# Patient Record
Sex: FEMALE | Race: WHITE | NOT HISPANIC OR LATINO | Employment: FULL TIME | URBAN - METROPOLITAN AREA
[De-identification: names, ages, dates, MRNs, and addresses within clinical notes are randomized per-mention and may not be internally consistent; named-entity substitution may affect disease eponyms.]

---

## 2017-01-22 ENCOUNTER — HOSPITAL ENCOUNTER (EMERGENCY)
Facility: HOSPITAL | Age: 42
Discharge: HOME | End: 2017-01-22
Payer: COMMERCIAL

## 2017-01-22 VITALS
SYSTOLIC BLOOD PRESSURE: 139 MMHG | HEART RATE: 64 BPM | TEMPERATURE: 98 F | WEIGHT: 130 LBS | BODY MASS INDEX: 19.7 KG/M2 | RESPIRATION RATE: 20 BRPM | OXYGEN SATURATION: 99 % | HEIGHT: 68 IN | DIASTOLIC BLOOD PRESSURE: 76 MMHG

## 2017-01-22 DIAGNOSIS — S82.892A ANKLE FRACTURE, LEFT, CLOSED, INITIAL ENCOUNTER: ICD-10-CM

## 2017-01-22 DIAGNOSIS — M25.572 ACUTE LEFT ANKLE PAIN: ICD-10-CM

## 2017-01-22 DIAGNOSIS — S99.929A FOOT INJURY: ICD-10-CM

## 2017-01-22 DIAGNOSIS — S82.832A: Primary | ICD-10-CM

## 2017-01-22 PROCEDURE — 29515 APPLICATION SHORT LEG SPLINT: CPT

## 2017-01-22 PROCEDURE — 63600175 PHARM REV CODE 636 W HCPCS: Performed by: INTERNAL MEDICINE

## 2017-01-22 PROCEDURE — 99283 EMERGENCY DEPT VISIT LOW MDM: CPT | Mod: 25

## 2017-01-22 PROCEDURE — 63600175 PHARM REV CODE 636 W HCPCS: Performed by: PHYSICIAN ASSISTANT

## 2017-01-22 PROCEDURE — 96372 THER/PROPH/DIAG INJ SC/IM: CPT

## 2017-01-22 PROCEDURE — 25000003 PHARM REV CODE 250: Performed by: INTERNAL MEDICINE

## 2017-01-22 PROCEDURE — 25000003 PHARM REV CODE 250: Performed by: PHYSICIAN ASSISTANT

## 2017-01-22 RX ORDER — KETOROLAC TROMETHAMINE 30 MG/ML
60 INJECTION, SOLUTION INTRAMUSCULAR; INTRAVENOUS
Status: COMPLETED | OUTPATIENT
Start: 2017-01-22 | End: 2017-01-22

## 2017-01-22 RX ORDER — NAPROXEN 500 MG/1
500 TABLET ORAL 2 TIMES DAILY WITH MEALS
Qty: 12 TABLET | Refills: 0 | Status: SHIPPED | OUTPATIENT
Start: 2017-01-22 | End: 2017-09-01 | Stop reason: ALTCHOICE

## 2017-01-22 RX ORDER — HYDROMORPHONE HYDROCHLORIDE 2 MG/ML
1 INJECTION, SOLUTION INTRAMUSCULAR; INTRAVENOUS; SUBCUTANEOUS
Status: COMPLETED | OUTPATIENT
Start: 2017-01-22 | End: 2017-01-22

## 2017-01-22 RX ORDER — HYDROCODONE BITARTRATE AND ACETAMINOPHEN 10; 325 MG/1; MG/1
1 TABLET ORAL
Status: COMPLETED | OUTPATIENT
Start: 2017-01-22 | End: 2017-01-22

## 2017-01-22 RX ORDER — ONDANSETRON 4 MG/1
4 TABLET, FILM COATED ORAL EVERY 6 HOURS
Qty: 12 TABLET | Refills: 0 | Status: SHIPPED | OUTPATIENT
Start: 2017-01-22 | End: 2017-09-01

## 2017-01-22 RX ORDER — HYDROCODONE BITARTRATE AND ACETAMINOPHEN 7.5; 325 MG/1; MG/1
1 TABLET ORAL EVERY 6 HOURS PRN
Qty: 10 TABLET | Refills: 0 | Status: SHIPPED | OUTPATIENT
Start: 2017-01-22 | End: 2017-09-01 | Stop reason: ALTCHOICE

## 2017-01-22 RX ORDER — DIPHENHYDRAMINE HCL 50 MG
50 CAPSULE ORAL
Status: COMPLETED | OUTPATIENT
Start: 2017-01-22 | End: 2017-01-22

## 2017-01-22 RX ORDER — ONDANSETRON 4 MG/1
4 TABLET, ORALLY DISINTEGRATING ORAL ONCE
Qty: 1 TABLET | Refills: 0 | Status: SHIPPED | OUTPATIENT
Start: 2017-01-22 | End: 2017-01-22

## 2017-01-22 RX ORDER — ONDANSETRON 4 MG/1
4 TABLET, ORALLY DISINTEGRATING ORAL
Status: COMPLETED | OUTPATIENT
Start: 2017-01-22 | End: 2017-01-22

## 2017-01-22 RX ADMIN — HYDROCODONE BITARTRATE AND ACETAMINOPHEN 1 TABLET: 10; 325 TABLET ORAL at 04:01

## 2017-01-22 RX ADMIN — KETOROLAC TROMETHAMINE 60 MG: 30 INJECTION, SOLUTION INTRAMUSCULAR at 04:01

## 2017-01-22 RX ADMIN — DIPHENHYDRAMINE HYDROCHLORIDE 50 MG: 50 CAPSULE ORAL at 07:01

## 2017-01-22 RX ADMIN — ONDANSETRON 4 MG: 4 TABLET, ORALLY DISINTEGRATING ORAL at 04:01

## 2017-01-22 RX ADMIN — HYDROMORPHONE HYDROCHLORIDE 1 MG: 2 INJECTION, SOLUTION INTRAMUSCULAR; INTRAVENOUS; SUBCUTANEOUS at 06:01

## 2017-01-22 NOTE — DISCHARGE INSTRUCTIONS
Ankle Fracture, Distal Fibula  You have a fracture, or broken bone, of the end of the fibula bone. The fibula is one of two bones that support the ankle joint.    Home care  · You will be given a splint, cast, or special boot to prevent movement at the injury site. Do not put weight on a splint. It will break. Follow your healthcare providers advice about when to begin bearing weight on a cast or boot.  · Keep your leg elevated when sitting or lying down. When sleeping, place a pillow under the injured leg. When sitting, support the injured leg so it is level with your waist. This is very important during the first 48 hours.  · Keep the cast or splint completely dry at all times. When bathing, protect the cast or splint with 2 large plastic bags. Place 1 bag outside of the other. Tape each bag with duct tape at the top end. Water can still leak in even when the foot is covered. So it's best to keep the cast, splint, or boot away from water. If a fiberglass cast or splint gets wet, dry it with a hair dryer on a cool setting.  · Place an ice pack over the injured area for no more than 15 to 20 minutes. Do this every 3 to 6 hours for the first 24 to 48 hours. Continue this 3 to 4 times a day as needed. To make an ice pack, put ice cubes in a plastic bag that seals at the top. Wrap the bag in a clean, thin towel or cloth. Never put ice or an ice pack directly on the skin. The ice pack can be put right on the cast or splint. As the ice melts, be careful that the cast or splint doesnt get wet.  · You may use over-the-counter pain medicine to control pain, unless another pain medicine was prescribed. If you have chronic liver or kidney disease or ever had a stomach ulcer or GI bleeding, talk with your provider before using these medicines.  Follow-up care  Follow up with your healthcare provider in 1 week, or as advised. This is to be sure the bone is healing properly. If you were given a splint, it may be changed to a  cast after the swelling goes down.  If X-rays were taken, you will be told of any new findings that may affect your care.  When to seek medical advice  Call your healthcare provider right away if any of these occur:  · The plaster cast or splint becomes wet or soft  · The fiberglass cast or splint stays wet for more than 24 hours  · There is increased tightness or pain under the cast or splint  · Your toes become swollen, cold, blue, numb, or tingly  · The cast becomes loose  · The cast has a bad smell  · Sore areas develop under the cast  · The cast develops cracks or breaks   © 2550-3952 [employer]. [address] [place] [address], [place] [address]. All rights reserved. This information is not intended as a substitute for professional medical care. Always follow your healthcare professional's instructions.

## 2017-01-22 NOTE — ED AVS SNAPSHOT
[facility]  [address] [facility] [address]               Laisha Rico   2017  3:11 PM   ED    Description:  Female : 1975   Department:  [facility]            Your Care was Coordinated By:     Provider Role From To    Corine Castro PA-C Physician Assistant 17 [phone] --      Reason for Visit     Foot Pain           Diagnoses this Visit        Comments    Nondisplaced fracture of distal end of left fibula, initial encounter    -  Primary     Foot injury         Acute left ankle pain         Ankle fracture, left, closed, initial encounter           ED Disposition     None           To Do List           Follow-up Information     Follow up with [facility] In 3 days.    Specialty:  Orthopedics    Contact information:    [address] [facility] [address]  [facility] [address] [phone]    Additional information:    (off [email or web address]) 2nd floor       These Medications        Disp Refills Start End    naproxen (NAPROSYN) 500 MG tablet 12 tablet 0 2017     Take 1 tablet (500 mg total) by mouth 2 (two) times daily with meals. - Oral    Pharmacy: [facility] [address] [place] [address] [address] AT [facility] Ph #: [phone]       hydrocodone-acetaminophen 7.5-325mg (NORCO) 7.5-325 mg per tablet 10 tablet 0 2017     Take 1 tablet by mouth every 6 (six) hours as needed for Pain. - Oral    Pharmacy: [facility] [address] [place] [address] [address] AT [facility] Ph #: [phone]         [facility]     [facility] -  Assistance  Registered nurses in the [facility] provide clinical advisement, health education, appointment booking, and other advisory services.  Call for this free service at [phone].             Medications           Message regarding Medications     Verify the  changes and/or additions to your medication regime listed below are the same as discussed with your clinician today.  If any of these changes or additions are incorrect, please notify your healthcare provider.        START taking these NEW medications        Refills    naproxen (NAPROSYN) 500 MG tablet 0    Sig: Take 1 tablet (500 mg total) by mouth 2 (two) times daily with meals.    Class: Print    Route: Oral    hydrocodone-acetaminophen 7.5-325mg (NORCO) 7.5-325 mg per tablet 0    Sig: Take 1 tablet by mouth every 6 (six) hours as needed for Pain.    Class: Print    Route: Oral      These medications were administered today        Dose Freq    hydrocodone-acetaminophen 10-325mg per tablet 1 tablet 1 tablet ED 1 Time    Sig: Take 1 tablet by mouth ED 1 Time.    Class: Normal    Route: Oral    Cosign for Ordering: Required by Catia Sheth MD    ondansetron disintegrating tablet 4 mg 4 mg ED 1 Time    Sig: Take 1 tablet (4 mg total) by mouth ED 1 Time.    Class: Normal    Route: Oral    Cosign for Ordering: Required by Catia Sheth MD    ketorolac injection 60 mg 60 mg ED 1 Time    Sig: Inject 2 mLs (60 mg total) into the muscle ED 1 Time.    Class: Normal    Route: Intramuscular    Non-formulary Exception Code: Treatment failure with formulary alternative    Cosign for Ordering: Required by Brigid Lanier MD           Verify that the below list of medications is an accurate representation of the medications you are currently taking.  If none reported, the list may be blank. If incorrect, please contact your healthcare provider. Carry this list with you in case of emergency.           Current Medications     hydrocodone-acetaminophen 7.5-325mg (NORCO) 7.5-325 mg per tablet Take 1 tablet by mouth every 6 (six) hours as needed for Pain.    ketorolac injection 60 mg Inject 2 mLs (60 mg total) into the muscle ED 1 Time.    naproxen (NAPROSYN) 500 MG tablet Take 1 tablet (500 mg total) by mouth 2 (two) times  "daily with meals.           Clinical Reference Information           Your Vitals Were     BP Pulse Temp Resp Height Weight    136/75 (BP Location: Right arm, Patient Position: Sitting) 89 98.2 °F (36.8 °C) (Oral) 18 5' 8" (1.727 m) 59 kg (130 lb)    SpO2 BMI             98% 19.77 kg/m2         Allergies as of 1/22/2017        Reactions    Iodine And Iodide Containing Products Itching, Swelling, Rash      Immunizations Administered on Date of Encounter - 1/22/2017     None      ED Micro, Lab, POCT     None      ED Imaging Orders     Start Ordered       Status Ordering Provider    01/22/17 1513 01/22/17 1513  X-Ray Ankle Complete Left  1 time imaging      Final result     01/22/17 1509 01/22/17 1508  X-Ray Foot Complete Left  1 time imaging      Final result         Discharge Instructions         Ankle Fracture, Distal Fibula  You have a fracture, or broken bone, of the end of the fibula bone. The fibula is one of two bones that support the ankle joint.    Home care  · You will be given a splint, cast, or special boot to prevent movement at the injury site. Do not put weight on a splint. It will break. Follow your healthcare providers advice about when to begin bearing weight on a cast or boot.  · Keep your leg elevated when sitting or lying down. When sleeping, place a pillow under the injured leg. When sitting, support the injured leg so it is level with your waist. This is very important during the first 48 hours.  · Keep the cast or splint completely dry at all times. When bathing, protect the cast or splint with 2 large plastic bags. Place 1 bag outside of the other. Tape each bag with duct tape at the top end. Water can still leak in even when the foot is covered. So it's best to keep the cast, splint, or boot away from water. If a fiberglass cast or splint gets wet, dry it with a hair dryer on a cool setting.  · Place an ice pack over the injured area for no more than 15 to 20 minutes. Do this every 3 to " 6 hours for the first 24 to 48 hours. Continue this 3 to 4 times a day as needed. To make an ice pack, put ice cubes in a plastic bag that seals at the top. Wrap the bag in a clean, thin towel or cloth. Never put ice or an ice pack directly on the skin. The ice pack can be put right on the cast or splint. As the ice melts, be careful that the cast or splint doesnt get wet.  · You may use over-the-counter pain medicine to control pain, unless another pain medicine was prescribed. If you have chronic liver or kidney disease or ever had a stomach ulcer or GI bleeding, talk with your provider before using these medicines.  Follow-up care  Follow up with your healthcare provider in 1 week, or as advised. This is to be sure the bone is healing properly. If you were given a splint, it may be changed to a cast after the swelling goes down.  If X-rays were taken, you will be told of any new findings that may affect your care.  When to seek medical advice  Call your healthcare provider right away if any of these occur:  · The plaster cast or splint becomes wet or soft  · The fiberglass cast or splint stays wet for more than 24 hours  · There is increased tightness or pain under the cast or splint  · Your toes become swollen, cold, blue, numb, or tingly  · The cast becomes loose  · The cast has a bad smell  · Sore areas develop under the cast  · The cast develops cracks or breaks   © 2214-4198 [employer]. [address] [place] [address]. All rights reserved. This information is not intended as a substitute for professional medical care. Always follow your healthcare professional's instructions.          [facility] Sign-Up     Activating your [facility] account is as easy as 1-2-3!     1) Visit [email or web address], select Sign Up Now, enter this activation code and your date of birth, then select Next.  [email or web address]  Expires: 3/8/2017  4:44 PM      2) Create a username and password to use when you visit  [facility] in the future and select a security question in case you lose your password and select Next.    3) Enter your e-mail address and click Sign Up!    Additional Information  If you have questions, please e-mail [email or web address] or call [phone] to talk to our [facility] staff. Remember, [facility] is NOT to be used for urgent needs. For medical emergencies, dial 911.          [facility] complies with applicable Federal civil rights laws and does not discriminate on the basis of race, color, national origin, age, disability, or sex.        Language Assistance Services     ATTENTION: Language assistance services are available, free of charge. Please call [phone].      ATENCIÓN: Si robyla jacqueline, tiene a bean disposición servicios gratuitos de asistencia lingüística. Llame al [phone].     CHÚ Ý: N?u b?n nói Ti?ng Vi?t, có các d?ch v? h? tr? ngôn ng? mi?n phí dành cho b?n. G?i s? [phone].

## 2017-01-22 NOTE — ED PROVIDER NOTES
"SCRIBE #1 NOTE: I, Sam Jaramillo, am scribing for, and in the presence of, RADHA Washington. I have scribed the entire note.      History      Chief Complaint   Patient presents with    Foot Pain     pt. reports left foot pain and swelling        Review of patient's allergies indicates:   Allergen Reactions    Iodine and iodide containing products Itching, Swelling and Rash        HPI   HPI    1/22/2017, 3:12 PM   History obtained from the patient      History of Present Illness: Laisha Rico is a 41 y.o. female patient who presents to the Emergency Department for left foot/ankle pain and swelling which onset suddenly 30-45 minutes ago while pt was jumping on trampoline at [email or web address]. Pt states she was jumping, landed on the mat improperly on left foot hearing a "popping sound". Pt points to base of 5th metatarsal and lateral malleolus to identify location of pain. Sx are constant and moderate in severity. Pt states she cannot bear weight on that foot. Pain worse with movement. There are no other mitigating or exacerbating factors noted.  Pt denies any weakness or numbness, N/V, CP, knee pain SOB, and all other sx at this time. No further complaints or concerns at this time.       Arrival mode: Personal vehicle      PCP: Primary Doctor No       Past Medical History:  Past Medical History   Diagnosis Date    Migraines        Past Surgical History:  Past Surgical History   Procedure Laterality Date    Removed chicken bone from throat           Family History:  No family history on file.    Social History:  Social History     Social History Main Topics    Smoking status: Never Smoker    Smokeless tobacco: Not on file    Alcohol use No    Drug use: No    Sexual activity: Yes       ROS   Review of Systems   Constitutional: Negative for chills and fever.   HENT: Negative for sore throat and trouble swallowing.    Respiratory: Negative for cough and shortness of breath.    Cardiovascular: Negative for chest pain. "   Gastrointestinal: Negative for abdominal pain, diarrhea, nausea and vomiting.   Genitourinary: Negative for dysuria.   Musculoskeletal: Positive for gait problem. Negative for back pain.        + left foot pain and swelling  - knee pain   Skin: Negative for rash and wound.   Neurological: Negative for weakness and numbness.   Hematological: Does not bruise/bleed easily.   All other systems reviewed and are negative.      Physical Exam    Initial Vitals   BP Pulse Resp Temp SpO2   01/22/17 1505 01/22/17 1505 01/22/17 1505 01/22/17 1505 01/22/17 1505   136/75 89 18 98.2 °F (36.8 °C) 98 %      Physical Exam  Nursing Notes and Vital Signs Reviewed.  Constitutional: Patient is in no acute distress. Awake and alert. Well-developed and well-nourished.  Head: Atraumatic. Normocephalic.  Eyes: PERRL. EOM intact. Conjunctivae are not pale. No scleral icterus.  ENT: Mucous membranes are moist.    Neck: Supple. Full ROM.    Cardiovascular: Regular rate. Well perfused.  Pulmonary/Chest: No respiratory distress.    Abdominal: Soft and non-distended.   Musculoskeletal: Moves all extremities. No obvious deformities. No edema.   Left Knee:  No obvious deformity. There is no swelling or tenderness.  No increased warmth, erythema, induration or fluctuance.  No ligament laxity.  DP and PT pulses are 2+.  Normal capillary refill.  Distal sensation is intact.  Left Ankle:  No obvious deformity. There is swelling and tenderness to palpation of 5th metatarsal and lateral malleolus.  No bony tenderness over navicular.  No proximal fibular tenderness. She is not able to bear weight.   Ankle ROM limited due to pain.  Intact sensation to light touch. Distal capillary refill takes less than 2 seconds.  PT and DP pulses are 2+ bilaterally.  Skin: Warm and dry.  Neurological:  Alert, awake, and appropriate.  Normal speech.  No acute focal neurological deficits are appreciated.  Psychiatric: Normal affect. Good eye contact. Appropriate in  "content.    ED Course    Orthopedic Injury  Date/Time: 1/22/2017 4:35 PM  Authorized by: PHILLIP NEIL   Performed by: CHELSEA JOHNSON  Injury location: ankle  Location details: left ankle  Injury type: fracture (distal fibula)  Pre-procedure distal perfusion: normal  Pre-procedure neurological function: normal  Pre-procedure neurovascular assessment: neurovascularly intact  Pre-procedure range of motion: reduced  Immobilization: splint  Splint type: short leg  Supplies used: aluminum splint  Complications: No  Post-procedure neurovascular assessment: post-procedure neurovascularly intact  Post-procedure distal perfusion: normal  Post-procedure neurological function: normal  Post-procedure range of motion: unchanged  Patient tolerance: Patient tolerated the procedure well with no immediate complications        ED Vital Signs:  Vitals:    01/22/17 1505   BP: 136/75   Pulse: 89   Resp: 18   Temp: 98.2 °F (36.8 °C)   TempSrc: Oral   SpO2: 98%   Weight: 59 kg (130 lb)   Height: 5' 8" (1.727 m)      Imaging Results:  Imaging Results         X-Ray Ankle Complete Left (Final result) Result time:  01/22/17 16:31:43    Final result by Montse Moran MD (01/22/17 16:31:43)    Impression:     There is transverse nondisplaced fracture through the distal head of the fibula.      Electronically signed by: MONTSE MORAN MD  Date:     01/22/17  Time:    16:31     Narrative:    Procedure: XR ANKLE COMPLETE 3 VIEW LEFT    History:  Pain in left ankle.    Findings: There is transverse fracture through the distal fibula at the level of the lateral malleolus.  This appears nondisplaced with associated soft tissue swelling.  The tibia appears intact.  The mortise joint of the ankle appears intact.            X-Ray Foot Complete Left (Final result) Result time:  01/22/17 16:29:33    Final result by Montse Moran MD (01/22/17 16:29:33)    Impression:     Negative study      Electronically signed by: MONTSE MORAN MD  Date: "     01/22/17  Time:    16:29     Narrative:    Complete three view left foot x-ray.     History: Unspecified injury of left foot.  Initial encounter.    Bone density and architecture are normal. No acute findings.                 The Emergency Provider reviewed the vital signs and test results, which are outlined above.    ED Discussion     4:45 PM: Reassessed pt at this time. Awake and alert. Pt states her pain has improved at this time. Discussed with pt all pertinent ED information and results. Discussed pt dx and plan of tx. Gave pt all f/u and return to the ED instructions. All questions and concerns were addressed at this time. Pt expresses understanding of information and instructions, and is comfortable with plan to discharge. Pt is stable for discharge.    I discussed with patient and/or family/caretaker that evaluation in the ED does not suggest any emergent or life threatening medical conditions requiring immediate intervention beyond what was provided in the ED, and I believe patient is safe for discharge.  Regardless, an unremarkable evaluation in the ED does not preclude the development or presence of a serious of life threatening condition. As such, patient was instructed to return immediately for any worsening or change in current symptoms.      ED Medication(s):  Medications   hydrocodone-acetaminophen 10-325mg per tablet 1 tablet (1 tablet Oral Given 1/22/17 1612)   ondansetron disintegrating tablet 4 mg (4 mg Oral Given 1/22/17 1613)   ketorolac injection 60 mg (60 mg Intramuscular Given 1/22/17 1647)       New Prescriptions    HYDROCODONE-ACETAMINOPHEN 7.5-325MG (NORCO) 7.5-325 MG PER TABLET    Take 1 tablet by mouth every 6 (six) hours as needed for Pain.    NAPROXEN (NAPROSYN) 500 MG TABLET    Take 1 tablet (500 mg total) by mouth 2 (two) times daily with meals.       Follow-up Information     Follow up with [facility] In 3 days.    Specialty:  Orthopedics    Contact information:     [address]  [facility] [address]  [phone]    Additional information:    (off [facility]) 2nd floor             Medical Decision Making    Medical Decision Making:   Clinical Tests:   Radiological Study: Ordered and Reviewed           Scribe Attestation:   Scribe #1: I performed the above scribed service and the documentation accurately describes the services I performed. I attest to the accuracy of the note.    APC:   APC Statement for Scribe #1: I, RADHA Washington, personally performed the services described in this documentation, as scribed by Sam Jaramillo in my presence, and it is both accurate and complete.          Clinical Impression       ICD-10-CM ICD-9-CM   1. Nondisplaced fracture of distal end of left fibula, initial encounter S82.832A 824.8   2. Foot injury S99.929A 959.7   3. Acute left ankle pain M25.572 719.47   4. Ankle fracture, left, closed, initial encounter S82.892A 824.8       Disposition:   Disposition: Discharged  Condition: Stable         Corine Castro PA-C  01/22/17 1657

## 2017-02-21 ENCOUNTER — OFFICE VISIT (OUTPATIENT)
Dept: INTERNAL MEDICINE | Facility: CLINIC | Age: 42
End: 2017-02-21
Payer: COMMERCIAL

## 2017-02-21 VITALS
TEMPERATURE: 98 F | SYSTOLIC BLOOD PRESSURE: 118 MMHG | HEIGHT: 68 IN | DIASTOLIC BLOOD PRESSURE: 78 MMHG | WEIGHT: 133.81 LBS | BODY MASS INDEX: 20.28 KG/M2

## 2017-02-21 DIAGNOSIS — J06.9 VIRAL URI: Primary | ICD-10-CM

## 2017-02-21 PROCEDURE — 99999 PR PBB SHADOW E&M-EST. PATIENT-LVL III: CPT | Mod: PBBFAC,,, | Performed by: NURSE PRACTITIONER

## 2017-02-21 PROCEDURE — 99213 OFFICE O/P EST LOW 20 MIN: CPT | Mod: 25,S$GLB,, | Performed by: NURSE PRACTITIONER

## 2017-02-21 PROCEDURE — 96372 THER/PROPH/DIAG INJ SC/IM: CPT | Mod: S$GLB,,, | Performed by: NURSE PRACTITIONER

## 2017-02-21 RX ORDER — METHYLPREDNISOLONE ACETATE 80 MG/ML
80 INJECTION, SUSPENSION INTRA-ARTICULAR; INTRALESIONAL; INTRAMUSCULAR; SOFT TISSUE
Status: COMPLETED | OUTPATIENT
Start: 2017-02-21 | End: 2017-02-21

## 2017-02-21 RX ADMIN — METHYLPREDNISOLONE ACETATE 80 MG: 80 INJECTION, SUSPENSION INTRA-ARTICULAR; INTRALESIONAL; INTRAMUSCULAR; SOFT TISSUE at 03:02

## 2017-02-21 NOTE — PROGRESS NOTES
"Subjective:      Patient ID: Laisha Rico is a 41 y.o. female.    Chief Complaint: Sore Throat and Fever    HPI:  Patient states for the last 3 days she has had nasal congestion, mild sore throat, headache.   She is not coughing or wheezing.  She is feeling some sinus pressure. Has had temp of 100.7 once at home.  She denies coughing, sob, or wheezing.  Denies ill contacts.  She is going to fly for work in a few day, ears feel full    Past Medical History   Diagnosis Date    Migraines        Past Surgical History   Procedure Laterality Date    Removed chicken bone from throat         No results found for: WBC, HGB, HCT, PLT, CHOL, TRIG, HDL, LDLDIRECT, ALT, AST, NA, K, CL, CREATININE, BUN, CO2, TSH, PSA, INR, GLUF, HGBA1C, MICROALBUR    Visit Vitals    /78 (BP Location: Left arm, Patient Position: Sitting, BP Method: Manual)    Temp 98.2 °F (36.8 °C) (Tympanic)    Ht 5' 7.5" (1.715 m)    Wt 60.7 kg (133 lb 13.1 oz)    LMP 02/12/2017 (Exact Date)    BMI 20.65 kg/m2         Review of Systems   Constitutional: Negative for appetite change, fatigue and unexpected weight change.   HENT: Positive for congestion and sore throat. Negative for ear pain, postnasal drip, rhinorrhea, sinus pressure, sneezing, tinnitus, trouble swallowing and voice change.    Eyes: Negative for pain, discharge, redness, itching and visual disturbance.   Respiratory: Negative for cough, chest tightness, shortness of breath and wheezing.    Cardiovascular: Negative for chest pain, palpitations and leg swelling.   Gastrointestinal: Negative for abdominal distention, abdominal pain, blood in stool, constipation, diarrhea, nausea and vomiting.        No reflux.   Genitourinary: Negative for difficulty urinating, dyspareunia, flank pain, menstrual problem and pelvic pain.   Musculoskeletal: Negative for arthralgias, back pain, myalgias and neck stiffness.   Skin: Negative for color change and rash.   Neurological: Negative for " dizziness and headaches.   Psychiatric/Behavioral: Negative for confusion and sleep disturbance. The patient is not nervous/anxious.       Objective:     Physical Exam   Constitutional: She is oriented to person, place, and time. She appears well-developed and well-nourished. No distress.   HENT:   Head: Normocephalic and atraumatic.   Mouth/Throat: Oropharynx is clear and moist. No oropharyngeal exudate.   Bilateral TM bulging slightly, no redness or drainage  No sinus ttp   Neck: Neck supple.   Cardiovascular: Normal rate, regular rhythm and normal heart sounds.  Exam reveals no gallop and no friction rub.    No murmur heard.  Pulmonary/Chest: Effort normal and breath sounds normal. No respiratory distress. She has no wheezes. She has no rales.   Musculoskeletal: She exhibits no edema or tenderness.   Lymphadenopathy:     She has no cervical adenopathy.   Neurological: She is alert and oriented to person, place, and time. No cranial nerve deficit.   Skin: Skin is warm and dry. She is not diaphoretic.   Psychiatric: She has a normal mood and affect. Her behavior is normal.   Nursing note and vitals reviewed.    Assessment:      1. Viral URI      Plan:   Viral URI    Other orders  -     methylPREDNISolone acetate injection 80 mg; Inject 1 mL (80 mg total) into the muscle one time.    Force fluids, rest.  flonase nasal spray daily    Current Outpatient Prescriptions:     hydrocodone-acetaminophen 7.5-325mg (NORCO) 7.5-325 mg per tablet, Take 1 tablet by mouth every 6 (six) hours as needed for Pain., Disp: 10 tablet, Rfl: 0    naproxen (NAPROSYN) 500 MG tablet, Take 1 tablet (500 mg total) by mouth 2 (two) times daily with meals., Disp: 12 tablet, Rfl: 0    ondansetron (ZOFRAN) 4 MG tablet, Take 1 tablet (4 mg total) by mouth every 6 (six) hours., Disp: 12 tablet, Rfl: 0  No current facility-administered medications for this visit.

## 2017-02-21 NOTE — MR AVS SNAPSHOT
[facility]  [address] [place] [id number]  Phone: [phone]                  Laisha Rico   2017 3:00 PM   Office Visit    Description:  Female : 1975   Provider:  Osmani Blanco NP   Department:  [facility]           Reason for Visit     Sore Throat     Fever                To Do List           Goals (5 Years of Data)     None      [facility]     [facility] On Call Nurse [facility] Line -  Assistance  Registered nurses in the [facility] On Call Center provide clinical advisement, health education, appointment booking, and other advisory services.  Call for this free service at [phone].             Medications           Message regarding Medications     Verify the changes and/or additions to your medication regime listed below are the same as discussed with your clinician today.  If any of these changes or additions are incorrect, please notify your healthcare provider.        These medications were administered today        Dose Freq    methylPREDNISolone acetate injection 80 mg 80 mg Clinic/HOD 1 time    Sig: Inject 1 mL (80 mg total) into the muscle one time.    Class: Normal    Route: Intramuscular           Verify that the below list of medications is an accurate representation of the medications you are currently taking.  If none reported, the list may be blank. If incorrect, please contact your healthcare provider. Carry this list with you in case of emergency.           Current Medications     hydrocodone-acetaminophen 7.5-325mg (NORCO) 7.5-325 mg per tablet Take 1 tablet by mouth every 6 (six) hours as needed for Pain.    naproxen (NAPROSYN) 500 MG tablet Take 1 tablet (500 mg total) by mouth 2 (two) times daily with meals.    ondansetron (ZOFRAN) 4 MG tablet Take 1 tablet (4 mg total) by mouth every 6 (six) hours.           Clinical Reference Information           Your Vitals Were     BP Temp Height    118/78 (BP Location: Left  "arm, Patient Position: Sitting, BP Method: Manual) 98.2 °F (36.8 °C) (Tympanic) 5' 7.5" (1.715 m)    Weight Last Period BMI    60.7 kg (133 lb 13.1 oz) 02/12/2017 (Exact Date) 20.65 kg/m2      Blood Pressure          Most Recent Value    BP  118/78      Allergies as of 2/21/2017     Iodine And Iodide Containing Products      Immunizations Administered on Date of Encounter - 2/21/2017     None      [facility] Sign-Up     Activating your [facility] account is as easy as 1-2-3!     1) Visit [email or web address], select Sign Up Now, enter this activation code and your date of birth, then select Next.  [email or web address]  Expires: 3/8/2017  4:44 PM      2) Create a username and password to use when you visit [facility] in the future and select a security question in case you lose your password and select Next.    3) Enter your e-mail address and click Sign Up!    Additional Information  If you have questions, please e-mail [email or web address] or call [phone] to talk to our [facility] staff. Remember, [facility] is NOT to be used for urgent needs. For medical emergencies, dial 911.         Instructions    Vanesa JONES and Isaura Wilhelm can help with the ear        Language Assistance Services     ATTENTION: Language assistance services are available, free of charge. Please call [phone].      ATENCIÓN: Si habla español, tiene a bean disposición servicios gratuitos de asistencia lingüística. Llame al [phone].     CHÚ Ý: N?u b?n nói Ti?ng Vi?t, có các d?ch v? h? tr? ngôn ng? mi?n phí dành cho b?n. G?i s? [phone].         [facility] complies with applicable Federal civil rights laws and does not discriminate on the basis of race, color, national origin, age, disability, or sex.        "

## 2017-02-23 ENCOUNTER — TELEPHONE (OUTPATIENT)
Dept: INTERNAL MEDICINE | Facility: CLINIC | Age: 42
End: 2017-02-23

## 2017-02-23 RX ORDER — AMOXICILLIN 875 MG/1
875 TABLET, FILM COATED ORAL 2 TIMES DAILY
Qty: 14 TABLET | Refills: 0 | Status: SHIPPED | OUTPATIENT
Start: 2017-02-23 | End: 2017-03-02

## 2017-02-23 NOTE — TELEPHONE ENCOUNTER
----- Message from Bautista Oliva sent at 2/23/2017  8:27 AM CST -----  Contact: Pt   Pt states she still has a fever and now has a cough and is requesting a Rx./ She can be reached at [phone]    [facility] [id number] [place] [address] [address] [facility]  [address]  [facility] [address]  Phone: [phone] Fax: [phone]

## 2017-02-23 NOTE — TELEPHONE ENCOUNTER
"S/w pt. Stated, " I actually have a cough now and I still have fever at 100.2. When I blow and cough, it is brownish green. I think that this is bacterial. Can I get an antibiotic now? I am going out of town this weekend. ". MATTHEW 02/21/17. Please advise. /TGD  "

## 2017-09-01 ENCOUNTER — OFFICE VISIT (OUTPATIENT)
Dept: INTERNAL MEDICINE | Facility: CLINIC | Age: 42
End: 2017-09-01
Payer: COMMERCIAL

## 2017-09-01 VITALS
RESPIRATION RATE: 18 BRPM | HEART RATE: 96 BPM | OXYGEN SATURATION: 99 % | DIASTOLIC BLOOD PRESSURE: 69 MMHG | BODY MASS INDEX: 19.78 KG/M2 | TEMPERATURE: 98 F | SYSTOLIC BLOOD PRESSURE: 120 MMHG | WEIGHT: 130.5 LBS | HEIGHT: 68 IN

## 2017-09-01 DIAGNOSIS — R50.9 FEVER, UNSPECIFIED FEVER CAUSE: Primary | ICD-10-CM

## 2017-09-01 DIAGNOSIS — J32.9 SINUSITIS, UNSPECIFIED CHRONICITY, UNSPECIFIED LOCATION: ICD-10-CM

## 2017-09-01 LAB
CTP QC/QA: YES
FLUAV AG NPH QL: NEGATIVE
FLUBV AG NPH QL: NEGATIVE

## 2017-09-01 PROCEDURE — 99999 PR PBB SHADOW E&M-EST. PATIENT-LVL III: CPT | Mod: PBBFAC,,, | Performed by: FAMILY MEDICINE

## 2017-09-01 PROCEDURE — 99214 OFFICE O/P EST MOD 30 MIN: CPT | Mod: 25,S$GLB,, | Performed by: FAMILY MEDICINE

## 2017-09-01 PROCEDURE — 96372 THER/PROPH/DIAG INJ SC/IM: CPT | Mod: S$GLB,,, | Performed by: FAMILY MEDICINE

## 2017-09-01 PROCEDURE — 3008F BODY MASS INDEX DOCD: CPT | Mod: S$GLB,,, | Performed by: FAMILY MEDICINE

## 2017-09-01 PROCEDURE — 87804 INFLUENZA ASSAY W/OPTIC: CPT | Mod: QW,S$GLB,, | Performed by: FAMILY MEDICINE

## 2017-09-01 RX ORDER — AZITHROMYCIN 250 MG/1
250 TABLET, FILM COATED ORAL DAILY
Qty: 6 TABLET | Refills: 0 | Status: SHIPPED | OUTPATIENT
Start: 2017-09-01 | End: 2017-09-27 | Stop reason: ALTCHOICE

## 2017-09-01 RX ORDER — METHYLPREDNISOLONE ACETATE 80 MG/ML
80 INJECTION, SUSPENSION INTRA-ARTICULAR; INTRALESIONAL; INTRAMUSCULAR; SOFT TISSUE
Status: COMPLETED | OUTPATIENT
Start: 2017-09-01 | End: 2017-09-01

## 2017-09-01 RX ADMIN — METHYLPREDNISOLONE ACETATE 80 MG: 80 INJECTION, SUSPENSION INTRA-ARTICULAR; INTRALESIONAL; INTRAMUSCULAR; SOFT TISSUE at 12:09

## 2017-09-01 NOTE — PROGRESS NOTES
Depo Medrol 80 mg given IM left ventrogluteal , pt tolerated well, she left the clinic in stable condition

## 2017-09-01 NOTE — PROGRESS NOTES
Subjective:       Patient ID: Laisha Rico is a 42 y.o. female.    Chief Complaint: Cough (cough, sinus congestion and fever 101.5 for 2 days )      Patient reports fever, coughing, sinus pain and body aches. Reports 2 of her children with similar symptoms. Tmax 101.5 yesterday evening. Reports she has frequent problems with her sinuses.      Cough   This is a new problem. The current episode started in the past 7 days. The problem has been unchanged. The problem occurs every few hours. The cough is non-productive. Associated symptoms include chills, myalgias, nasal congestion, postnasal drip and rhinorrhea. Pertinent negatives include no chest pain, fever or sore throat. Nothing aggravates the symptoms. She has tried nothing for the symptoms. There is no history of asthma or COPD.     Review of Systems   Constitutional: Positive for appetite change, chills and fatigue. Negative for fever.   HENT: Positive for postnasal drip, rhinorrhea, sinus pain and sinus pressure. Negative for congestion and sore throat.    Eyes: Negative for visual disturbance.   Respiratory: Positive for cough. Negative for chest tightness.    Cardiovascular: Negative for chest pain.   Gastrointestinal: Negative for abdominal pain.   Endocrine: Negative for polyuria.   Musculoskeletal: Positive for myalgias. Negative for gait problem.   Skin: Negative for color change.   Allergic/Immunologic: Negative for immunocompromised state.   Neurological: Negative for dizziness.   Hematological: Does not bruise/bleed easily.   Psychiatric/Behavioral: Negative for decreased concentration.     Past Medical History:   Diagnosis Date    Migraines      Past Surgical History:   Procedure Laterality Date    removed chicken bone from throat       History reviewed. No pertinent family history.  Social History     Social History    Marital status:      Spouse name: N/A    Number of children: 5    Years of education: N/A     Occupational History  "   [employer]     Social History Main Topics    Smoking status: Never Smoker    Smokeless tobacco: Never Used    Alcohol use No    Drug use: No    Sexual activity: Yes     Other Topics Concern    Not on file     Social History Narrative    No narrative on file     Review of patient's allergies indicates:   Allergen Reactions    Iodine and iodide containing products Itching, Swelling and Rash       Objective:       /69 (BP Location: Left arm, Patient Position: Sitting, BP Method: Large (Automatic))   Pulse 96   Temp 97.6 °F (36.4 °C) (Tympanic)   Resp 18   Ht 5' 7.5" (1.715 m)   Wt 59.2 kg (130 lb 8.2 oz)   LMP 08/16/2017   SpO2 99%   BMI 20.14 kg/m²   Physical Exam   Constitutional: She is oriented to person, place, and time. She appears well-developed and well-nourished. No distress.   HENT:   Head: Normocephalic.   Right Ear: Tympanic membrane, external ear and ear canal normal.   Left Ear: Tympanic membrane, external ear and ear canal normal.   Nose: Mucosal edema present. Right sinus exhibits maxillary sinus tenderness and frontal sinus tenderness. Left sinus exhibits maxillary sinus tenderness and frontal sinus tenderness.   Mouth/Throat: Uvula is midline, oropharynx is clear and moist and mucous membranes are normal.   Eyes: EOM are normal. Pupils are equal, round, and reactive to light.   Neck: No thyromegaly present.   Cardiovascular: Normal rate, regular rhythm, normal heart sounds and intact distal pulses.    Pulmonary/Chest: Effort normal and breath sounds normal. No respiratory distress.   Abdominal: Soft. She exhibits no distension. There is no tenderness.   Musculoskeletal: Normal range of motion. She exhibits no edema.   Lymphadenopathy:     She has no cervical adenopathy.   Neurological: She is alert and oriented to person, place, and time.   Skin: Skin is warm and dry. Capillary refill takes less than 2 seconds. She is not diaphoretic.   Psychiatric: She has a " normal mood and affect. Her behavior is normal.   Nursing note and vitals reviewed.    Assessment:     1. Fever, unspecified fever cause    2. Sinusitis, unspecified chronicity, unspecified location      Plan:   Fever, unspecified fever cause  -     POCT Influenza A/B - negative    Sinusitis, unspecified chronicity, unspecified location    Other orders  -     methylPREDNISolone acetate injection 80 mg; Inject 1 mL (80 mg total) into the muscle one time.  -     azithromycin (Z-CHIO) 250 MG tablet; Take 1 tablet (250 mg total) by mouth once daily. Take 2 tablets on day 1, then 1 tablet on days 2-5  Dispense: 6 tablet; Refill: 0  -     pseudoephedrine-chlorpheniramine-codeine 2-30-10 mg/5 mL Liqd; Take 5 mLs by mouth every 6 (six) hours as needed (cough).  Dispense: 118 mL; Refill: 0      Medication List with Changes/Refills   New Medications    AZITHROMYCIN (Z-CHIO) 250 MG TABLET    Take 1 tablet (250 mg total) by mouth once daily. Take 2 tablets on day 1, then 1 tablet on days 2-5    PSEUDOEPHEDRINE-CHLORPHENIRAMINE-CODEINE 2-30-10 MG/5 ML LIQD    Take 5 mLs by mouth every 6 (six) hours as needed (cough).   Discontinued Medications    HYDROCODONE-ACETAMINOPHEN 7.5-325MG (NORCO) 7.5-325 MG PER TABLET    Take 1 tablet by mouth every 6 (six) hours as needed for Pain.    NAPROXEN (NAPROSYN) 500 MG TABLET    Take 1 tablet (500 mg total) by mouth 2 (two) times daily with meals.    ONDANSETRON (ZOFRAN) 4 MG TABLET    Take 1 tablet (4 mg total) by mouth every 6 (six) hours.

## 2017-09-27 ENCOUNTER — HOSPITAL ENCOUNTER (EMERGENCY)
Facility: HOSPITAL | Age: 42
Discharge: HOME | End: 2017-09-27
Attending: EMERGENCY MEDICINE
Payer: COMMERCIAL

## 2017-09-27 ENCOUNTER — TELEPHONE (OUTPATIENT)
Dept: OBSTETRICS AND GYNECOLOGY | Facility: CLINIC | Age: 42
End: 2017-09-27

## 2017-09-27 ENCOUNTER — OFFICE VISIT (OUTPATIENT)
Dept: INTERNAL MEDICINE | Facility: CLINIC | Age: 42
End: 2017-09-27
Payer: COMMERCIAL

## 2017-09-27 VITALS
TEMPERATURE: 98 F | WEIGHT: 127 LBS | OXYGEN SATURATION: 98 % | RESPIRATION RATE: 20 BRPM | HEIGHT: 68 IN | BODY MASS INDEX: 19.25 KG/M2 | DIASTOLIC BLOOD PRESSURE: 67 MMHG | HEART RATE: 77 BPM | SYSTOLIC BLOOD PRESSURE: 114 MMHG

## 2017-09-27 VITALS
HEIGHT: 67 IN | HEART RATE: 68 BPM | SYSTOLIC BLOOD PRESSURE: 116 MMHG | BODY MASS INDEX: 19.96 KG/M2 | OXYGEN SATURATION: 97 % | WEIGHT: 127.19 LBS | TEMPERATURE: 99 F | DIASTOLIC BLOOD PRESSURE: 76 MMHG

## 2017-09-27 DIAGNOSIS — R10.31 RLQ ABDOMINAL PAIN: ICD-10-CM

## 2017-09-27 DIAGNOSIS — O00.80 OTHER ECTOPIC PREGNANCY WITHOUT INTRAUTERINE PREGNANCY: Primary | ICD-10-CM

## 2017-09-27 DIAGNOSIS — R10.31 RIGHT LOWER QUADRANT ABDOMINAL PAIN: Primary | ICD-10-CM

## 2017-09-27 DIAGNOSIS — O00.109 TUBAL PREGNANCY WITHOUT INTRAUTERINE PREGNANCY: Primary | ICD-10-CM

## 2017-09-27 PROBLEM — O00.90 ECTOPIC PREGNANCY WITHOUT INTRAUTERINE PREGNANCY: Status: ACTIVE | Noted: 2017-09-27

## 2017-09-27 LAB
ABO + RH BLD: NORMAL
ALBUMIN SERPL BCP-MCNC: 4.5 G/DL
ALP SERPL-CCNC: 45 U/L
ALT SERPL W/O P-5'-P-CCNC: 14 U/L
ANION GAP SERPL CALC-SCNC: 10 MMOL/L
AST SERPL-CCNC: 16 U/L
B-HCG UR QL: POSITIVE
BACTERIA #/AREA URNS HPF: NORMAL /HPF
BASOPHILS # BLD AUTO: 0.02 K/UL
BASOPHILS NFR BLD: 0.3 %
BILIRUB SERPL-MCNC: 0.9 MG/DL
BILIRUB UR QL STRIP: NEGATIVE
BLD GP AB SCN CELLS X3 SERPL QL: NORMAL
BUN SERPL-MCNC: 16 MG/DL
CALCIUM SERPL-MCNC: 9.6 MG/DL
CHLORIDE SERPL-SCNC: 107 MMOL/L
CLARITY UR: CLEAR
CO2 SERPL-SCNC: 22 MMOL/L
COLOR UR: YELLOW
CREAT SERPL-MCNC: 0.8 MG/DL
DIFFERENTIAL METHOD: ABNORMAL
EOSINOPHIL # BLD AUTO: 0 K/UL
EOSINOPHIL NFR BLD: 0.4 %
ERYTHROCYTE [DISTWIDTH] IN BLOOD BY AUTOMATED COUNT: 13 %
EST. GFR  (AFRICAN AMERICAN): >60 ML/MIN/1.73 M^2
EST. GFR  (NON AFRICAN AMERICAN): >60 ML/MIN/1.73 M^2
GLUCOSE SERPL-MCNC: 88 MG/DL
GLUCOSE UR QL STRIP: NEGATIVE
HCG INTACT+B SERPL-ACNC: 801 MIU/ML
HCT VFR BLD AUTO: 38.8 %
HGB BLD-MCNC: 13.2 G/DL
HGB UR QL STRIP: ABNORMAL
HYALINE CASTS #/AREA URNS LPF: 0 /LPF
KETONES UR QL STRIP: NEGATIVE
LEUKOCYTE ESTERASE UR QL STRIP: NEGATIVE
LYMPHOCYTES # BLD AUTO: 1.1 K/UL
LYMPHOCYTES NFR BLD: 15.4 %
MCH RBC QN AUTO: 30.5 PG
MCHC RBC AUTO-ENTMCNC: 34 G/DL
MCV RBC AUTO: 90 FL
MICROSCOPIC COMMENT: NORMAL
MONOCYTES # BLD AUTO: 0.6 K/UL
MONOCYTES NFR BLD: 8.5 %
NEUTROPHILS # BLD AUTO: 5.2 K/UL
NEUTROPHILS NFR BLD: 75.4 %
NITRITE UR QL STRIP: NEGATIVE
PH UR STRIP: 6 [PH] (ref 5–8)
PLATELET # BLD AUTO: 170 K/UL
PMV BLD AUTO: 10.4 FL
POTASSIUM SERPL-SCNC: 3.9 MMOL/L
PROT SERPL-MCNC: 7.6 G/DL
PROT UR QL STRIP: ABNORMAL
RBC # BLD AUTO: 4.33 M/UL
RBC #/AREA URNS HPF: 1 /HPF (ref 0–4)
SODIUM SERPL-SCNC: 139 MMOL/L
SP GR UR STRIP: >=1.03 (ref 1–1.03)
SQUAMOUS #/AREA URNS HPF: 5 /HPF
URN SPEC COLLECT METH UR: ABNORMAL
UROBILINOGEN UR STRIP-ACNC: NEGATIVE EU/DL
WBC # BLD AUTO: 6.95 K/UL
WBC #/AREA URNS HPF: 2 /HPF (ref 0–5)

## 2017-09-27 PROCEDURE — 86900 BLOOD TYPING SEROLOGIC ABO: CPT

## 2017-09-27 PROCEDURE — 96376 TX/PRO/DX INJ SAME DRUG ADON: CPT

## 2017-09-27 PROCEDURE — 81000 URINALYSIS NONAUTO W/SCOPE: CPT

## 2017-09-27 PROCEDURE — 85025 COMPLETE CBC W/AUTO DIFF WBC: CPT

## 2017-09-27 PROCEDURE — 96372 THER/PROPH/DIAG INJ SC/IM: CPT

## 2017-09-27 PROCEDURE — 99999 PR PBB SHADOW E&M-EST. PATIENT-LVL III: CPT | Mod: PBBFAC,,, | Performed by: FAMILY MEDICINE

## 2017-09-27 PROCEDURE — 96375 TX/PRO/DX INJ NEW DRUG ADDON: CPT

## 2017-09-27 PROCEDURE — 84702 CHORIONIC GONADOTROPIN TEST: CPT

## 2017-09-27 PROCEDURE — 80053 COMPREHEN METABOLIC PANEL: CPT

## 2017-09-27 PROCEDURE — 99282 EMERGENCY DEPT VISIT SF MDM: CPT | Mod: ,,, | Performed by: OBSTETRICS & GYNECOLOGY

## 2017-09-27 PROCEDURE — 63600175 PHARM REV CODE 636 W HCPCS: Performed by: OBSTETRICS & GYNECOLOGY

## 2017-09-27 PROCEDURE — 81025 URINE PREGNANCY TEST: CPT

## 2017-09-27 PROCEDURE — 99213 OFFICE O/P EST LOW 20 MIN: CPT | Mod: S$GLB,,, | Performed by: FAMILY MEDICINE

## 2017-09-27 PROCEDURE — 86850 RBC ANTIBODY SCREEN: CPT

## 2017-09-27 PROCEDURE — 96374 THER/PROPH/DIAG INJ IV PUSH: CPT

## 2017-09-27 PROCEDURE — 3008F BODY MASS INDEX DOCD: CPT | Mod: S$GLB,,, | Performed by: FAMILY MEDICINE

## 2017-09-27 PROCEDURE — 63600175 PHARM REV CODE 636 W HCPCS: Performed by: EMERGENCY MEDICINE

## 2017-09-27 PROCEDURE — 99285 EMERGENCY DEPT VISIT HI MDM: CPT | Mod: 25

## 2017-09-27 RX ORDER — ONDANSETRON 4 MG/1
4 TABLET, FILM COATED ORAL EVERY 8 HOURS PRN
Qty: 12 TABLET | Refills: 0 | Status: ON HOLD | OUTPATIENT
Start: 2017-09-27 | End: 2017-10-07 | Stop reason: HOSPADM

## 2017-09-27 RX ORDER — HYDROCODONE BITARTRATE AND ACETAMINOPHEN 7.5; 325 MG/1; MG/1
1 TABLET ORAL EVERY 4 HOURS PRN
Qty: 15 TABLET | Refills: 0 | Status: ON HOLD | OUTPATIENT
Start: 2017-09-27 | End: 2017-10-07 | Stop reason: HOSPADM

## 2017-09-27 RX ORDER — ONDANSETRON 2 MG/ML
4 INJECTION INTRAMUSCULAR; INTRAVENOUS
Status: COMPLETED | OUTPATIENT
Start: 2017-09-27 | End: 2017-09-27

## 2017-09-27 RX ORDER — MORPHINE SULFATE 4 MG/ML
4 INJECTION, SOLUTION INTRAMUSCULAR; INTRAVENOUS
Status: COMPLETED | OUTPATIENT
Start: 2017-09-27 | End: 2017-09-27

## 2017-09-27 RX ORDER — MORPHINE SULFATE 4 MG/ML
6 INJECTION, SOLUTION INTRAMUSCULAR; INTRAVENOUS
Status: COMPLETED | OUTPATIENT
Start: 2017-09-27 | End: 2017-09-27

## 2017-09-27 RX ORDER — METHOTREXATE SODIUM 1 G/1
50 INJECTION, POWDER, LYOPHILIZED, FOR SOLUTION INTRA-ARTERIAL; INTRAMUSCULAR; INTRATHECAL; INTRAVENOUS ONCE
Status: COMPLETED | OUTPATIENT
Start: 2017-09-27 | End: 2017-09-27

## 2017-09-27 RX ADMIN — MORPHINE SULFATE 4 MG: 4 INJECTION, SOLUTION INTRAMUSCULAR; INTRAVENOUS at 09:09

## 2017-09-27 RX ADMIN — METHOTREXATE 85 MG: 25 INJECTION, SOLUTION INTRA-ARTERIAL; INTRAMUSCULAR; INTRATHECAL; INTRAVENOUS at 09:09

## 2017-09-27 RX ADMIN — MORPHINE SULFATE 6 MG: 4 INJECTION, SOLUTION INTRAMUSCULAR; INTRAVENOUS at 08:09

## 2017-09-27 RX ADMIN — ONDANSETRON 4 MG: 2 INJECTION INTRAMUSCULAR; INTRAVENOUS at 08:09

## 2017-09-27 NOTE — ED PROVIDER NOTES
SCRIBE #1 NOTE: I, Wolf Amanda, am scribing for, and in the presence of, Сергей Fisher MD. I have scribed the entire note.      History      Chief Complaint   Patient presents with    Abdominal Pain     RLQ sharp onset this morning, sent from PCP for eval for appendicitis       Review of patient's allergies indicates:   Allergen Reactions    Iodine and iodide containing products Itching, Swelling and Rash        HPI   HPI    9/27/2017, 4:30 PM   History obtained from the patient and       History of Present Illness: Laisha Rico is a 42 y.o. female patient who presents to the Emergency Department for right sided ABD pain which onset gradually this morning. Sxs are constant and moderate in severity. There are no mitigating or exacerbating factors noted. Associated sxs include nausea.  Pt denies any fever, emesis, chills, diarrrhea, dysuria, constipation, vaginal bleeding, vaginal discharge, and all other sxs at this time. Prior tx includes motrin, tylenol, and zofran without relief of pain. No further complaints or concerns at this time.         Arrival mode: Personal vehicle      PCP: Primary Doctor No       Past Medical History:  Past Medical History:   Diagnosis Date    Migraines        Past Surgical History:  Past Surgical History:   Procedure Laterality Date    removed chicken bone from throat           Family History:  History reviewed. No pertinent family history.    Social History:  Social History     Social History Main Topics    Smoking status: Never Smoker    Smokeless tobacco: Never Used    Alcohol use No    Drug use: No    Sexual activity: Yes       ROS   Review of Systems   Constitutional: Negative for fever.   HENT: Negative for sore throat.    Respiratory: Negative for shortness of breath.    Cardiovascular: Negative for chest pain.   Gastrointestinal: Positive for abdominal pain and nausea. Negative for blood in stool, constipation, diarrhea and vomiting.  "  Genitourinary: Negative for dysuria, hematuria, pelvic pain, vaginal bleeding, vaginal discharge and vaginal pain.   Musculoskeletal: Negative for back pain.   Skin: Negative for rash.   Neurological: Negative for weakness.   Hematological: Does not bruise/bleed easily.     Physical Exam      Initial Vitals [09/27/17 1628]   BP Pulse Resp Temp SpO2   109/68 81 18 97.9 °F (36.6 °C) 99 %      MAP       81.67          Physical Exam  Nursing Notes and Vital Signs Reviewed.  Constitutional: Patient is in no acute distress. Well-developed and well-nourished.  Head: Atraumatic. Normocephalic.  Eyes: PERRL. EOM intact. Conjunctivae are not pale. No scleral icterus.  ENT: Mucous membranes are moist. Oropharynx is clear and symmetric.    Neck: Supple. Full ROM. No lymphadenopathy.  Cardiovascular: Regular rate. Regular rhythm. No murmurs, rubs, or gallops. Distal pulses are 2+ and symmetric.  Pulmonary/Chest: No respiratory distress. Clear to auscultation bilaterally. No wheezing, rales, or rhonchi.  Abdominal: Soft and non-distended.  There is RLQ tenderness.  No rebound, guarding, or rigidity. Good bowel sounds.  Genitourinary: No CVA tenderness  Musculoskeletal: Moves all extremities. No obvious deformities. No edema. No calf tenderness.  Skin: Warm and dry.  Neurological:  Alert, awake, and appropriate.  Normal speech.  No acute focal neurological deficits are appreciated.  Psychiatric: Normal affect. Good eye contact. Appropriate in content.    ED Course    Procedures  ED Vital Signs:  Vitals:    09/27/17 1628 09/27/17 1841   BP: 109/68 130/79   Pulse: 81 84   Resp: 18 18   Temp: 97.9 °F (36.6 °C)    TempSrc: Oral    SpO2: 99% 99%   Weight: 57.6 kg (127 lb)    Height: 5' 8" (1.727 m)        Abnormal Lab Results:  Labs Reviewed   CBC W/ AUTO DIFFERENTIAL - Abnormal; Notable for the following:        Result Value    Gran% 75.4 (*)     Lymph% 15.4 (*)     All other components within normal limits   COMPREHENSIVE METABOLIC " PANEL - Abnormal; Notable for the following:     CO2 22 (*)     Alkaline Phosphatase 45 (*)     All other components within normal limits   URINALYSIS - Abnormal; Notable for the following:     Specific Gravity, UA >=1.030 (*)     Protein, UA 1+ (*)     Occult Blood UA Trace (*)     All other components within normal limits   PREGNANCY TEST, URINE RAPID   URINALYSIS MICROSCOPIC   HCG, QUANTITATIVE, PREGNANCY   TYPE & SCREEN        All Lab Results:  Results for orders placed or performed during the hospital encounter of 09/27/17   CBC auto differential   Result Value Ref Range    WBC 6.95 3.90 - 12.70 K/uL    RBC 4.33 4.00 - 5.40 M/uL    Hemoglobin 13.2 12.0 - 16.0 g/dL    Hematocrit 38.8 37.0 - 48.5 %    MCV 90 82 - 98 fL    MCH 30.5 27.0 - 31.0 pg    MCHC 34.0 32.0 - 36.0 g/dL    RDW 13.0 11.5 - 14.5 %    Platelets 170 150 - 350 K/uL    MPV 10.4 9.2 - 12.9 fL    Gran # 5.2 1.8 - 7.7 K/uL    Lymph # 1.1 1.0 - 4.8 K/uL    Mono # 0.6 0.3 - 1.0 K/uL    Eos # 0.0 0.0 - 0.5 K/uL    Baso # 0.02 0.00 - 0.20 K/uL    Gran% 75.4 (H) 38.0 - 73.0 %    Lymph% 15.4 (L) 18.0 - 48.0 %    Mono% 8.5 4.0 - 15.0 %    Eosinophil% 0.4 0.0 - 8.0 %    Basophil% 0.3 0.0 - 1.9 %    Differential Method Automated    Comprehensive metabolic panel   Result Value Ref Range    Sodium 139 136 - 145 mmol/L    Potassium 3.9 3.5 - 5.1 mmol/L    Chloride 107 95 - 110 mmol/L    CO2 22 (L) 23 - 29 mmol/L    Glucose 88 70 - 110 mg/dL    BUN, Bld 16 6 - 20 mg/dL    Creatinine 0.8 0.5 - 1.4 mg/dL    Calcium 9.6 8.7 - 10.5 mg/dL    Total Protein 7.6 6.0 - 8.4 g/dL    Albumin 4.5 3.5 - 5.2 g/dL    Total Bilirubin 0.9 0.1 - 1.0 mg/dL    Alkaline Phosphatase 45 (L) 55 - 135 U/L    AST 16 10 - 40 U/L    ALT 14 10 - 44 U/L    Anion Gap 10 8 - 16 mmol/L    eGFR if African American >60 >60 mL/min/1.73 m^2    eGFR if non African American >60 >60 mL/min/1.73 m^2   Urinalysis   Result Value Ref Range    Specimen UA Urine, Clean Catch     Color, UA Yellow Yellow,  Straw, Isabelle    Appearance, UA Clear Clear    pH, UA 6.0 5.0 - 8.0    Specific Gravity, UA >=1.030 (A) 1.005 - 1.030    Protein, UA 1+ (A) Negative    Glucose, UA Negative Negative    Ketones, UA Negative Negative    Bilirubin (UA) Negative Negative    Occult Blood UA Trace (A) Negative    Nitrite, UA Negative Negative    Urobilinogen, UA Negative <2.0 EU/dL    Leukocytes, UA Negative Negative   Pregnancy, urine rapid   Result Value Ref Range    Preg Test, Ur Positive    Urinalysis Microscopic   Result Value Ref Range    RBC, UA 1 0 - 4 /hpf    WBC, UA 2 0 - 5 /hpf    Bacteria, UA Rare None-Occ /hpf    Squam Epithel, UA 5 /hpf    Hyaline Casts, UA 0 0-1/lpf /lpf    Microscopic Comment SEE COMMENT    hCG, quantitative   Result Value Ref Range    hCG Quant 801 See Text mIU/mL   Type & Screen   Result Value Ref Range    Group & Rh B POS     Indirect Nya NEG          Imaging Results:  Imaging Results          US OB Less Than 14 Wks with Transvaginal (xpd) (Final result)  Result time 09/27/17 19:47:25   Procedure changed from US OB Less Than 14 Wks First Gestation     Final result by Levi Huff Jr., MD (09/27/17 19:47:25)                 Impression:        There are findings concerning for ectopic pregnancy with a small amount of hemorrhagic fluid within the dependent pelvis and a rounded 8mm structure closely related to the right adnexa.      Electronically signed by: LEVI HUFF M.D.  Date:     09/27/17  Time:    19:47              Narrative:    US OB LESS THAN 14 WKS WITH TRANSVAGINAL (XPD)    Clinical Indication: RLQ pain.     Findings:   Beta-hCG level is reported as 801.  The uterus measures 8.3 x 4.4 x 5.6 cm. There is no double decidua sign or gestational sac within the uterus.  The endometrial stripe measures up to 10 mm.  There is an 8mm cystic structure with surrounding echogenic wall closely related to the right adnexa.  It is separate from the right ovary, in a more medial and posterior position.  There is no abnormally increased vascular perfusion about the structure.  No embryo is evident within it.  There is a small amount of hemorrhagic fluid within the dependent pelvis.  This does not extend into Morison's pouch.    The right ovary measures 3.0 x 2.3 x 2.2 cm, and the left ovary measures 2.0 x 1.1 x 1.7 cm. Color and spectral Doppler evaluation of both ovaries demonstrates no evidence of torsion. There is a cyst with peripheral perfusion within the right ovary which likely relates to a corpus luteum.  It measures 1.8 x 1.64 1.5 cm.                                      The Emergency Provider reviewed the vital signs and test results, which are outlined above.    ED Discussion     8:04 PM: Dr. Fisher discussed the pt's case with Dr. Hui (OBGYN) who will evaluate pt in ED.    9:12 PM: Dr. Hui evaluated the pt and states the pt can be discharged home.    9:14 PM: Reassessed pt. Discussed with pt all pertinent ED information and results. Discussed plan of treatment with pt. Gave pt all f/u and return to the ED instructions. All questions and concerns were addressed at this time. Pt understands and agrees to plan as discussed. Pt is stable for discharge.     I discussed with patient and/or family/caretaker that evaluation in the ED does not suggest any emergent or life threatening medical conditions requiring immediate intervention beyond what was provided in the ED, and I believe patient is safe for discharge.  Regardless, an unremarkable evaluation in the ED does not preclude the development or presence of a serious of life threatening condition. As such, patient was instructed to return immediately for any worsening or change in current symptoms.    ED Medication(s):  Medications - No data to display    New Prescriptions    No medications on file             Medical Decision Making    Medical Decision Making:   Clinical Tests:   Lab Tests: Reviewed and Ordered  Radiological Study: Reviewed and  Ordered           Scribe Attestation:   Scribe #1: I performed the above scribed service and the documentation accurately describes the services I performed. I attest to the accuracy of the note.    Attending:   Physician Attestation Statement for Scribe #1: I, Сергей Fisher MD, personally performed the services described in this documentation, as scribed by Wolf Amanda, in my presence, and it is both accurate and complete.          Clinical Impression       ICD-10-CM ICD-9-CM   1. Other ectopic pregnancy without intrauterine pregnancy O00.80 633.80   2. RLQ abdominal pain R10.31 789.03       Disposition:   Disposition: Discharged  Condition: Stable         Сергей Fisher MD  09/28/17 1034

## 2017-09-27 NOTE — PROGRESS NOTES
"Subjective:       Patient ID: Laisha Rico is a 42 y.o. female.    Chief Complaint: Abdominal Pain      Patient complaining of RLQ pain that started around 9 AM this morning. Reports was in usual state of health earlier this morning, had sudden onset of pain. Describes constant, "deep" pain in RLQ. Nausea as well, took zofran, motrin and tylenol without any relief.      Abdominal Pain   This is a new problem. The current episode started today. The onset quality is sudden. The problem occurs constantly. The problem has been gradually worsening. The pain is located in the RLQ. Associated symptoms include nausea. Pertinent negatives include no constipation, diarrhea, fever or vomiting.     Review of Systems   Constitutional: Positive for appetite change. Negative for chills, diaphoresis and fever.   Gastrointestinal: Positive for abdominal pain and nausea. Negative for abdominal distention, blood in stool, constipation, diarrhea and vomiting.     Past Medical History:   Diagnosis Date    Migraines      Past Surgical History:   Procedure Laterality Date    removed chicken bone from throat       History reviewed. No pertinent family history.  Social History     Social History    Marital status:      Spouse name: N/A    Number of children: 5    Years of education: N/A     Occupational History    [employer]     Social History Main Topics    Smoking status: Never Smoker    Smokeless tobacco: Never Used    Alcohol use No    Drug use: No    Sexual activity: Yes     Other Topics Concern    Not on file     Social History Narrative    No narrative on file     Review of patient's allergies indicates:   Allergen Reactions    Iodine and iodide containing products Itching, Swelling and Rash       Objective:       /76   Pulse 68   Temp 98.5 °F (36.9 °C)   Ht 5' 7" (1.702 m)   Wt 57.7 kg (127 lb 3.3 oz)   LMP 08/29/2017   SpO2 97%   BMI 19.92 kg/m²   Physical Exam   Constitutional: " She appears well-developed and well-nourished. She appears distressed.   Cardiovascular: Normal rate, regular rhythm and normal heart sounds.    Pulmonary/Chest: Effort normal and breath sounds normal. No respiratory distress.   Abdominal: Soft. Bowel sounds are normal. There is tenderness. There is guarding.   Skin: She is not diaphoretic.   Psychiatric: She has a normal mood and affect. Her behavior is normal.     Assessment:     1. Right lower quadrant abdominal pain      Plan:   Right lower quadrant abdominal pain    Patient sent to ER to r/o appendicitis.  Called and gave report to [facility].  Medication List with Changes/Refills   Discontinued Medications    AZITHROMYCIN (Z-CHIO) 250 MG TABLET    Take 1 tablet (250 mg total) by mouth once daily. Take 2 tablets on day 1, then 1 tablet on days 2-5    PSEUDOEPHEDRINE-CHLORPHENIRAMINE-CODEINE 2-30-10 MG/5 ML LIQD    Take 5 mLs by mouth every 6 (six) hours as needed (cough).

## 2017-09-27 NOTE — PATIENT INSTRUCTIONS
Go immediately to the Emergency Room as we discussed..   Do not eat or drink anything on the way there.

## 2017-09-28 NOTE — TELEPHONE ENCOUNTER
Left messages for the pt. to call back. herlinda wong    Patient diagnosed with right ectopic;   bhcg 9/27/17--801; treated with mtx--85 mg     Needs bhcg on Monday and Thursday;      Lab appt placed, please contact pt with time to do lab on 10/2 Monday and 10/5  thursday

## 2017-09-28 NOTE — TELEPHONE ENCOUNTER
Patient diagnosed with right ectopic;   bhcg 9/27/17--801; treated with mtx--85 mg    Needs bhcg on Monday and Thursday;     Lab appt placed, please contact pt with time to do lab on 10/2 Monday and 10/5  thursday

## 2017-09-28 NOTE — ASSESSMENT & PLAN NOTE
Pt has been advised that bhcg is very low and this could represent a very early intrauterine pregnancy or early ectopic;  Options reviewed with patient--observation and f/u bhcg in 48 hrs; diagnostic laparoscopy; or treat with methotrexate  Patient prefers to treat with methotrexate  Pt has been advised that the  risk of ruptured ectopic exists despite treatment with mtx  Pt has been advised to avoid intercourse, exercise and travel until bhcg level decreases to <1 (non pregnant)  Rupture ectopic precautions reviewed  mtx orders placed  Aware will need labs drawn on d 4 and d7 (mon and Thursday)  All questions answered to the best of my ability.

## 2017-09-28 NOTE — PROGRESS NOTES
[facility] -   Obstetrics & Gynecology  Progress Note    Patient Name: Laisha Rico  MRN: [id number]  Admission Date: 2017  Primary Care Provider: Primary Doctor No  Principal Problem: Ectopic pregnancy without intrauterine pregnancy    Subjective:     HPI:      Pt is a  who presents to the emergency department with lower pelvic pain.  Patient did not know she was pregnant as has not yet missed a menses.     Pregnancy is undesired.  Patient has plans for hysterectomy.  Beta-hCG level is reported as 801.  sono findings reviewed with patient;    The uterus measures 8.3 x 4.4 x 5.6 cm. There is no double decidua sign or gestational sac within the uterus.  The endometrial stripe measures up to 10 mm.  There is an 8mm cystic structure with surrounding echogenic wall closely related to the right adnexa.  It is separate from the right ovary, in a more medial and posterior position. There is no abnormally increased vascular perfusion about the structure.  No embryo is evident within it.  There is a small amount of hemorrhagic fluid within the dependent pelvis.  This does not extend into Morison's pouch.usea/vomiting.      Obstetric History     No data available        Past Medical History:   Diagnosis Date    Migraines      Past Surgical History:   Procedure Laterality Date    removed chicken bone from throat           (Not in a hospital admission)    Review of patient's allergies indicates:   Allergen Reactions    Iodine and iodide containing products Itching, Swelling and Rash        Family History     None        Social History Main Topics    Smoking status: Never Smoker    Smokeless tobacco: Never Used    Alcohol use No    Drug use: No    Sexual activity: Yes     Review of Systems   Constitutional: Negative for activity change, appetite change, chills, diaphoresis, fatigue, fever and unexpected weight change.   HENT: Negative for mouth sores and tinnitus.    Eyes: Negative for discharge  and visual disturbance.   Respiratory: Negative for cough, shortness of breath and wheezing.    Cardiovascular: Negative for chest pain, palpitations and leg swelling.   Gastrointestinal: Negative for abdominal pain, bloating, blood in stool, constipation, diarrhea, nausea and vomiting.   Endocrine: Negative for diabetes, hair loss, hot flashes, hyperthyroidism and hypothyroidism.   Genitourinary: Positive for pelvic pain. Negative for decreased libido, dyspareunia, dysuria, flank pain, frequency, genital sores, hematuria, menorrhagia, menstrual problem, urgency, vaginal bleeding, vaginal discharge, vaginal pain, dysmenorrhea, urinary incontinence, postcoital bleeding, postmenopausal bleeding and vaginal odor.   Musculoskeletal: Negative for back pain and myalgias.   Skin:  Negative for rash, no acne and hair changes.   Neurological: Negative for seizures, syncope, numbness and headaches.   Hematological: Negative for adenopathy. Does not bruise/bleed easily.   Psychiatric/Behavioral: Negative for depression and sleep disturbance. The patient is not nervous/anxious.    Breast: Negative for breast mass, breast pain, nipple discharge and skin changes     Objective:     Vital Signs (Most Recent):  Temp: 97.9 °F (36.6 °C) (09/27/17 1628)  Pulse: 76 (09/27/17 2029)  Resp: 18 (09/27/17 2029)  BP: 126/73 (09/27/17 2029)  SpO2: 99 % (09/27/17 2029) Vital Signs (24h Range):  Temp:  [97.9 °F (36.6 °C)-98.5 °F (36.9 °C)] 97.9 °F (36.6 °C)  Pulse:  [68-84] 76  Resp:  [18] 18  SpO2:  [97 %-99 %] 99 %  BP: (109-130)/(68-79) 126/73     Weight: 57.6 kg (127 lb)  Body mass index is 19.31 kg/m².    Patient's last menstrual period was 08/29/2017.    Physical Exam:   Constitutional: She appears well-developed.     Eyes: Conjunctivae and EOM are normal. Pupils are equal, round, and reactive to light.    Neck: Normal range of motion. Neck supple.     Pulmonary/Chest: Effort normal.        Abdominal: Soft. Bowel sounds are normal. There  is no tenderness. There is no rebound and no guarding.             Musculoskeletal: Normal range of motion.       Neurological: She is alert.    Skin: Skin is warm.    Psychiatric: She has a normal mood and affect.     Pelvic deferred and as per er MD    Laboratory:  Beta HCG: No results for input(s): HCGPREGUR in the last 48 hours.  BMP:   Recent Labs  Lab 09/27/17  1645   GLU 88      K 3.9      CO2 22*   BUN 16   CREATININE 0.8   CALCIUM 9.6     CBC:   Recent Labs  Lab 09/27/17  1645   WBC 6.95   RBC 4.33   HGB 13.2   HCT 38.8      MCV 90   MCH 30.5   MCHC 34.0       Diagnostic Results:  US: Reviewed  see report above    Assessment/Plan:     * Ectopic pregnancy without intrauterine pregnancy    Pt has been advised that bhcg is very low and this could represent a very early intrauterine pregnancy or early ectopic;  Options reviewed with patient--observation and f/u bhcg in 48 hrs; diagnostic laparoscopy; or treat with methotrexate  Patient prefers to treat with methotrexate  Pt has been advised that the  risk of ruptured ectopic exists despite treatment with mtx  Pt has been advised to avoid intercourse, exercise and travel until bhcg level decreases to <1 (non pregnant)  Rupture ectopic precautions reviewed  mtx orders placed  Aware will need labs drawn on d 4 and d7 (mon and Thursday)  All questions answered to the best of my ability.             Fiona Hui MD  Obstetrics & Gynecology  [facility] -

## 2017-09-28 NOTE — SUBJECTIVE & OBJECTIVE
Pt is a  who presents to labor and delivery with undesired pregnancy.  Did not know she was pregnant.  Pregnancy is undesired.  Patient has plans for hysterectomy.  Beta-hCG level is reported as 801.  sono findings reviewed with patient;    The uterus measures 8.3 x 4.4 x 5.6 cm. There is no double decidua sign or gestational sac within the uterus.  The endometrial stripe measures up to 10 mm.  There is an 8mm cystic structure with surrounding echogenic wall closely related to the right adnexa.  It is separate from the right ovary, in a more medial and posterior position. There is no abnormally increased vascular perfusion about the structure.  No embryo is evident within it.  There is a small amount of hemorrhagic fluid within the dependent pelvis.  This does not extend into Morison's pouch.usea/vomiting.      Obstetric History     No data available        Past Medical History:   Diagnosis Date    Migraines      Past Surgical History:   Procedure Laterality Date    removed chicken bone from throat           (Not in a hospital admission)    Review of patient's allergies indicates:   Allergen Reactions    Iodine and iodide containing products Itching, Swelling and Rash        Family History     None        Social History Main Topics    Smoking status: Never Smoker    Smokeless tobacco: Never Used    Alcohol use No    Drug use: No    Sexual activity: Yes     Review of Systems   Constitutional: Negative for activity change, appetite change, chills, diaphoresis, fatigue, fever and unexpected weight change.   HENT: Negative for mouth sores and tinnitus.    Eyes: Negative for discharge and visual disturbance.   Respiratory: Negative for cough, shortness of breath and wheezing.    Cardiovascular: Negative for chest pain, palpitations and leg swelling.   Gastrointestinal: Negative for abdominal pain, bloating, blood in stool, constipation, diarrhea, nausea and vomiting.   Endocrine: Negative for diabetes, hair  loss, hot flashes, hyperthyroidism and hypothyroidism.   Genitourinary: Positive for pelvic pain. Negative for decreased libido, dyspareunia, dysuria, flank pain, frequency, genital sores, hematuria, menorrhagia, menstrual problem, urgency, vaginal bleeding, vaginal discharge, vaginal pain, dysmenorrhea, urinary incontinence, postcoital bleeding, postmenopausal bleeding and vaginal odor.   Musculoskeletal: Negative for back pain and myalgias.   Skin:  Negative for rash, no acne and hair changes.   Neurological: Negative for seizures, syncope, numbness and headaches.   Hematological: Negative for adenopathy. Does not bruise/bleed easily.   Psychiatric/Behavioral: Negative for depression and sleep disturbance. The patient is not nervous/anxious.    Breast: Negative for breast mass, breast pain, nipple discharge and skin changes     Objective:     Vital Signs (Most Recent):  Temp: 97.9 °F (36.6 °C) (09/27/17 1628)  Pulse: 76 (09/27/17 2029)  Resp: 18 (09/27/17 2029)  BP: 126/73 (09/27/17 2029)  SpO2: 99 % (09/27/17 2029) Vital Signs (24h Range):  Temp:  [97.9 °F (36.6 °C)-98.5 °F (36.9 °C)] 97.9 °F (36.6 °C)  Pulse:  [68-84] 76  Resp:  [18] 18  SpO2:  [97 %-99 %] 99 %  BP: (109-130)/(68-79) 126/73     Weight: 57.6 kg (127 lb)  Body mass index is 19.31 kg/m².    Patient's last menstrual period was 08/29/2017.    Physical Exam:   Constitutional: She appears well-developed.     Eyes: Conjunctivae and EOM are normal. Pupils are equal, round, and reactive to light.    Neck: Normal range of motion. Neck supple.     Pulmonary/Chest: Effort normal.        Abdominal: Soft. Bowel sounds are normal. There is no tenderness. There is no rebound and no guarding.             Musculoskeletal: Normal range of motion.       Neurological: She is alert.    Skin: Skin is warm.    Psychiatric: She has a normal mood and affect.     Pelvic deferred and as per er MD    Laboratory:  Beta HCG: No results for input(s): HCGPREGUR in the last 48  hours.  BMP:   Recent Labs  Lab 09/27/17  1645   GLU 88      K 3.9      CO2 22*   BUN 16   CREATININE 0.8   CALCIUM 9.6     CBC:   Recent Labs  Lab 09/27/17  1645   WBC 6.95   RBC 4.33   HGB 13.2   HCT 38.8      MCV 90   MCH 30.5   MCHC 34.0       Diagnostic Results:  US: Reviewed  see report above

## 2017-10-02 ENCOUNTER — LAB VISIT (OUTPATIENT)
Dept: LAB | Facility: HOSPITAL | Age: 42
End: 2017-10-02
Attending: OBSTETRICS & GYNECOLOGY
Payer: COMMERCIAL

## 2017-10-02 DIAGNOSIS — O00.109 TUBAL PREGNANCY WITHOUT INTRAUTERINE PREGNANCY: ICD-10-CM

## 2017-10-02 LAB — HCG INTACT+B SERPL-ACNC: 1219 MIU/ML

## 2017-10-02 PROCEDURE — 84702 CHORIONIC GONADOTROPIN TEST: CPT

## 2017-10-02 PROCEDURE — 36415 COLL VENOUS BLD VENIPUNCTURE: CPT | Mod: PO

## 2017-10-03 ENCOUNTER — TELEPHONE (OUTPATIENT)
Dept: OBSTETRICS AND GYNECOLOGY | Facility: CLINIC | Age: 42
End: 2017-10-03

## 2017-10-03 NOTE — TELEPHONE ENCOUNTER
Spoke to the pt. and informed her that per Dr. Hui advise pt the hormone level increased slightly as expected.    Make sure she gets labs drawn on Thursday; and no exercise, travel, intercourse. Pt. Acknowledged understanding. herlinda wong

## 2017-10-05 ENCOUNTER — LAB VISIT (OUTPATIENT)
Dept: LAB | Facility: HOSPITAL | Age: 42
End: 2017-10-05
Attending: OBSTETRICS & GYNECOLOGY
Payer: COMMERCIAL

## 2017-10-05 DIAGNOSIS — O00.109 TUBAL PREGNANCY WITHOUT INTRAUTERINE PREGNANCY: ICD-10-CM

## 2017-10-05 LAB — HCG INTACT+B SERPL-ACNC: 1415 MIU/ML

## 2017-10-05 PROCEDURE — 36415 COLL VENOUS BLD VENIPUNCTURE: CPT | Mod: PO

## 2017-10-05 PROCEDURE — 84702 CHORIONIC GONADOTROPIN TEST: CPT

## 2017-10-06 ENCOUNTER — TELEPHONE (OUTPATIENT)
Dept: OBSTETRICS AND GYNECOLOGY | Facility: CLINIC | Age: 42
End: 2017-10-06

## 2017-10-06 NOTE — TELEPHONE ENCOUNTER
Pt states she's still having severe pain from recent ectopic pregnancy. Would like to know if this is still common. Her levels are still increasing and she states she is having pains that won't go away. She states Dr. Hui told her to come in for another methotrexate inj if the levels cont to increase. I will forward this message to the provider on call for further assistance. She is aware that someone will contact her to further assist. She has been advised if the pain continues to be intolerable without relief from ibuprofen to go back to the ED. DS

## 2017-10-06 NOTE — TELEPHONE ENCOUNTER
----- Message from Kelli Decker sent at 10/6/2017  2:11 PM CDT -----  Contact: Patient   Patient stated that she is still having cramps, Please call her at [phone].    Thanks  td

## 2017-10-07 ENCOUNTER — ANESTHESIA EVENT (OUTPATIENT)
Dept: SURGERY | Facility: HOSPITAL | Age: 42
End: 2017-10-07
Payer: COMMERCIAL

## 2017-10-07 ENCOUNTER — SURGERY (OUTPATIENT)
Age: 42
End: 2017-10-07

## 2017-10-07 ENCOUNTER — HOSPITAL ENCOUNTER (OUTPATIENT)
Facility: HOSPITAL | Age: 42
Discharge: HOME | End: 2017-10-07
Attending: EMERGENCY MEDICINE | Admitting: OBSTETRICS & GYNECOLOGY
Payer: COMMERCIAL

## 2017-10-07 ENCOUNTER — ANESTHESIA (OUTPATIENT)
Dept: SURGERY | Facility: HOSPITAL | Age: 42
End: 2017-10-07
Payer: COMMERCIAL

## 2017-10-07 ENCOUNTER — NURSE TRIAGE (OUTPATIENT)
Dept: ADMINISTRATIVE | Facility: CLINIC | Age: 42
End: 2017-10-07

## 2017-10-07 DIAGNOSIS — O00.101 RIGHT TUBAL PREGNANCY WITHOUT INTRAUTERINE PREGNANCY: Primary | ICD-10-CM

## 2017-10-07 DIAGNOSIS — O00.201 RIGHT OVARIAN PREGNANCY WITHOUT INTRAUTERINE PREGNANCY: ICD-10-CM

## 2017-10-07 DIAGNOSIS — R10.31 RIGHT LOWER QUADRANT PAIN: ICD-10-CM

## 2017-10-07 LAB
ABO + RH BLD: NORMAL
ALBUMIN SERPL BCP-MCNC: 3.7 G/DL
ALP SERPL-CCNC: 45 U/L
ALT SERPL W/O P-5'-P-CCNC: 13 U/L
ANION GAP SERPL CALC-SCNC: 9 MMOL/L
AST SERPL-CCNC: 11 U/L
BASOPHILS # BLD AUTO: 0 K/UL
BASOPHILS NFR BLD: 0 %
BILIRUB SERPL-MCNC: 0.6 MG/DL
BLD GP AB SCN CELLS X3 SERPL QL: NORMAL
BUN SERPL-MCNC: 18 MG/DL
CALCIUM SERPL-MCNC: 8.9 MG/DL
CHLORIDE SERPL-SCNC: 110 MMOL/L
CO2 SERPL-SCNC: 22 MMOL/L
CREAT SERPL-MCNC: 0.9 MG/DL
DIFFERENTIAL METHOD: ABNORMAL
EOSINOPHIL # BLD AUTO: 0.1 K/UL
EOSINOPHIL NFR BLD: 2.9 %
ERYTHROCYTE [DISTWIDTH] IN BLOOD BY AUTOMATED COUNT: 13.1 %
EST. GFR  (AFRICAN AMERICAN): >60 ML/MIN/1.73 M^2
EST. GFR  (NON AFRICAN AMERICAN): >60 ML/MIN/1.73 M^2
GLUCOSE SERPL-MCNC: 90 MG/DL
HCG INTACT+B SERPL-ACNC: 887 MIU/ML
HCT VFR BLD AUTO: 31.9 %
HGB BLD-MCNC: 10.6 G/DL
LYMPHOCYTES # BLD AUTO: 0.8 K/UL
LYMPHOCYTES NFR BLD: 32.5 %
MCH RBC QN AUTO: 29.9 PG
MCHC RBC AUTO-ENTMCNC: 33.2 G/DL
MCV RBC AUTO: 90 FL
MONOCYTES # BLD AUTO: 0.3 K/UL
MONOCYTES NFR BLD: 12.1 %
NEUTROPHILS # BLD AUTO: 1.3 K/UL
NEUTROPHILS NFR BLD: 52.5 %
PLATELET # BLD AUTO: 133 K/UL
PMV BLD AUTO: 9.6 FL
POTASSIUM SERPL-SCNC: 3.9 MMOL/L
PROT SERPL-MCNC: 6.3 G/DL
RBC # BLD AUTO: 3.54 M/UL
SODIUM SERPL-SCNC: 141 MMOL/L
WBC # BLD AUTO: 2.4 K/UL

## 2017-10-07 PROCEDURE — 99284 EMERGENCY DEPT VISIT MOD MDM: CPT | Mod: 57,,, | Performed by: PHYSICIAN ASSISTANT

## 2017-10-07 PROCEDURE — 88305 TISSUE EXAM BY PATHOLOGIST: CPT | Performed by: PATHOLOGY

## 2017-10-07 PROCEDURE — 25000003 PHARM REV CODE 250: Performed by: NURSE ANESTHETIST, CERTIFIED REGISTERED

## 2017-10-07 PROCEDURE — 63600175 PHARM REV CODE 636 W HCPCS: Performed by: OBSTETRICS & GYNECOLOGY

## 2017-10-07 PROCEDURE — 37000009 HC ANESTHESIA EA ADD 15 MINS: Performed by: OBSTETRICS & GYNECOLOGY

## 2017-10-07 PROCEDURE — 86850 RBC ANTIBODY SCREEN: CPT

## 2017-10-07 PROCEDURE — 96375 TX/PRO/DX INJ NEW DRUG ADDON: CPT

## 2017-10-07 PROCEDURE — 80053 COMPREHEN METABOLIC PANEL: CPT

## 2017-10-07 PROCEDURE — 59151 TREAT ECTOPIC PREGNANCY: CPT | Mod: AS,,, | Performed by: PHYSICIAN ASSISTANT

## 2017-10-07 PROCEDURE — 63600175 PHARM REV CODE 636 W HCPCS: Performed by: EMERGENCY MEDICINE

## 2017-10-07 PROCEDURE — 27201423 OPTIME MED/SURG SUP & DEVICES STERILE SUPPLY: Performed by: OBSTETRICS & GYNECOLOGY

## 2017-10-07 PROCEDURE — 88302 TISSUE EXAM BY PATHOLOGIST: CPT | Mod: 26,,, | Performed by: PATHOLOGY

## 2017-10-07 PROCEDURE — 84702 CHORIONIC GONADOTROPIN TEST: CPT

## 2017-10-07 PROCEDURE — 88305 TISSUE EXAM BY PATHOLOGIST: CPT | Mod: 26,,, | Performed by: PATHOLOGY

## 2017-10-07 PROCEDURE — 63600175 PHARM REV CODE 636 W HCPCS: Performed by: ANESTHESIOLOGY

## 2017-10-07 PROCEDURE — 99285 EMERGENCY DEPT VISIT HI MDM: CPT

## 2017-10-07 PROCEDURE — 86900 BLOOD TYPING SEROLOGIC ABO: CPT

## 2017-10-07 PROCEDURE — 36000708 HC OR TIME LEV III 1ST 15 MIN: Performed by: OBSTETRICS & GYNECOLOGY

## 2017-10-07 PROCEDURE — 59151 TREAT ECTOPIC PREGNANCY: CPT | Mod: ,,, | Performed by: OBSTETRICS & GYNECOLOGY

## 2017-10-07 PROCEDURE — 71000039 HC RECOVERY, EACH ADD'L HOUR: Performed by: OBSTETRICS & GYNECOLOGY

## 2017-10-07 PROCEDURE — 71000016 HC POSTOP RECOV ADDL HR: Performed by: OBSTETRICS & GYNECOLOGY

## 2017-10-07 PROCEDURE — 63600175 PHARM REV CODE 636 W HCPCS: Performed by: NURSE ANESTHETIST, CERTIFIED REGISTERED

## 2017-10-07 PROCEDURE — 85025 COMPLETE CBC W/AUTO DIFF WBC: CPT

## 2017-10-07 PROCEDURE — 71000033 HC RECOVERY, INTIAL HOUR: Performed by: OBSTETRICS & GYNECOLOGY

## 2017-10-07 PROCEDURE — 37000008 HC ANESTHESIA 1ST 15 MINUTES: Performed by: OBSTETRICS & GYNECOLOGY

## 2017-10-07 PROCEDURE — 36000709 HC OR TIME LEV III EA ADD 15 MIN: Performed by: OBSTETRICS & GYNECOLOGY

## 2017-10-07 PROCEDURE — 71000015 HC POSTOP RECOV 1ST HR: Performed by: OBSTETRICS & GYNECOLOGY

## 2017-10-07 PROCEDURE — 96374 THER/PROPH/DIAG INJ IV PUSH: CPT

## 2017-10-07 PROCEDURE — 25000003 PHARM REV CODE 250: Performed by: ANESTHESIOLOGY

## 2017-10-07 RX ORDER — ONDANSETRON 8 MG/1
8 TABLET, ORALLY DISINTEGRATING ORAL EVERY 8 HOURS PRN
Status: DISCONTINUED | OUTPATIENT
Start: 2017-10-07 | End: 2017-10-07 | Stop reason: HOSPADM

## 2017-10-07 RX ORDER — ONDANSETRON 2 MG/ML
4 INJECTION INTRAMUSCULAR; INTRAVENOUS DAILY PRN
Status: DISCONTINUED | OUTPATIENT
Start: 2017-10-07 | End: 2017-10-07 | Stop reason: HOSPADM

## 2017-10-07 RX ORDER — DIPHENHYDRAMINE HYDROCHLORIDE 50 MG/ML
25 INJECTION INTRAMUSCULAR; INTRAVENOUS EVERY 6 HOURS PRN
Status: DISCONTINUED | OUTPATIENT
Start: 2017-10-07 | End: 2017-10-07 | Stop reason: HOSPADM

## 2017-10-07 RX ORDER — CEFAZOLIN SODIUM 2 G/50ML
2 SOLUTION INTRAVENOUS
Status: DISCONTINUED | OUTPATIENT
Start: 2017-10-07 | End: 2017-10-07 | Stop reason: HOSPADM

## 2017-10-07 RX ORDER — OXYCODONE HYDROCHLORIDE 5 MG/1
5 TABLET ORAL
Status: DISCONTINUED | OUTPATIENT
Start: 2017-10-07 | End: 2017-10-07 | Stop reason: HOSPADM

## 2017-10-07 RX ORDER — KETOROLAC TROMETHAMINE 30 MG/ML
15 INJECTION, SOLUTION INTRAMUSCULAR; INTRAVENOUS EVERY 6 HOURS PRN
Status: DISCONTINUED | OUTPATIENT
Start: 2017-10-07 | End: 2017-10-07 | Stop reason: HOSPADM

## 2017-10-07 RX ORDER — HYDROCODONE BITARTRATE AND ACETAMINOPHEN 5; 325 MG/1; MG/1
1 TABLET ORAL EVERY 6 HOURS PRN
Qty: 10 TABLET | Refills: 0 | Status: SHIPPED | OUTPATIENT
Start: 2017-10-07 | End: 2017-10-09 | Stop reason: SDUPTHER

## 2017-10-07 RX ORDER — ONDANSETRON 2 MG/ML
4 INJECTION INTRAMUSCULAR; INTRAVENOUS
Status: COMPLETED | OUTPATIENT
Start: 2017-10-07 | End: 2017-10-07

## 2017-10-07 RX ORDER — PROPOFOL 10 MG/ML
VIAL (ML) INTRAVENOUS
Status: DISCONTINUED | OUTPATIENT
Start: 2017-10-07 | End: 2017-10-07

## 2017-10-07 RX ORDER — SODIUM CHLORIDE, SODIUM LACTATE, POTASSIUM CHLORIDE, CALCIUM CHLORIDE 600; 310; 30; 20 MG/100ML; MG/100ML; MG/100ML; MG/100ML
INJECTION, SOLUTION INTRAVENOUS CONTINUOUS PRN
Status: DISCONTINUED | OUTPATIENT
Start: 2017-10-07 | End: 2017-10-07

## 2017-10-07 RX ORDER — NEOSTIGMINE METHYLSULFATE 1 MG/ML
INJECTION, SOLUTION INTRAVENOUS
Status: DISCONTINUED | OUTPATIENT
Start: 2017-10-07 | End: 2017-10-07

## 2017-10-07 RX ORDER — ONDANSETRON 2 MG/ML
INJECTION INTRAMUSCULAR; INTRAVENOUS
Status: DISCONTINUED | OUTPATIENT
Start: 2017-10-07 | End: 2017-10-07

## 2017-10-07 RX ORDER — FENTANYL CITRATE 50 UG/ML
INJECTION, SOLUTION INTRAMUSCULAR; INTRAVENOUS
Status: DISCONTINUED | OUTPATIENT
Start: 2017-10-07 | End: 2017-10-07

## 2017-10-07 RX ORDER — FENTANYL CITRATE 50 UG/ML
25 INJECTION, SOLUTION INTRAMUSCULAR; INTRAVENOUS EVERY 5 MIN PRN
Status: COMPLETED | OUTPATIENT
Start: 2017-10-07 | End: 2017-10-07

## 2017-10-07 RX ORDER — MEPERIDINE HYDROCHLORIDE 50 MG/ML
12.5 INJECTION INTRAMUSCULAR; INTRAVENOUS; SUBCUTANEOUS ONCE AS NEEDED
Status: COMPLETED | OUTPATIENT
Start: 2017-10-07 | End: 2017-10-07

## 2017-10-07 RX ORDER — SODIUM CHLORIDE 0.9 % (FLUSH) 0.9 %
3 SYRINGE (ML) INJECTION EVERY 8 HOURS
Status: DISCONTINUED | OUTPATIENT
Start: 2017-10-07 | End: 2017-10-07 | Stop reason: HOSPADM

## 2017-10-07 RX ORDER — ROCURONIUM BROMIDE 10 MG/ML
INJECTION, SOLUTION INTRAVENOUS
Status: DISCONTINUED | OUTPATIENT
Start: 2017-10-07 | End: 2017-10-07

## 2017-10-07 RX ORDER — MORPHINE SULFATE 10 MG/ML
2 INJECTION INTRAMUSCULAR; INTRAVENOUS; SUBCUTANEOUS EVERY 10 MIN PRN
Status: DISCONTINUED | OUTPATIENT
Start: 2017-10-07 | End: 2017-10-07 | Stop reason: HOSPADM

## 2017-10-07 RX ORDER — GLYCOPYRROLATE 0.2 MG/ML
INJECTION INTRAMUSCULAR; INTRAVENOUS
Status: DISCONTINUED | OUTPATIENT
Start: 2017-10-07 | End: 2017-10-07

## 2017-10-07 RX ORDER — KETOROLAC TROMETHAMINE 30 MG/ML
30 INJECTION, SOLUTION INTRAMUSCULAR; INTRAVENOUS
Status: COMPLETED | OUTPATIENT
Start: 2017-10-07 | End: 2017-10-07

## 2017-10-07 RX ORDER — SODIUM CHLORIDE 0.9 % (FLUSH) 0.9 %
3 SYRINGE (ML) INJECTION
Status: DISCONTINUED | OUTPATIENT
Start: 2017-10-07 | End: 2017-10-07 | Stop reason: HOSPADM

## 2017-10-07 RX ORDER — LIDOCAINE HYDROCHLORIDE 10 MG/ML
INJECTION INFILTRATION; PERINEURAL
Status: DISCONTINUED | OUTPATIENT
Start: 2017-10-07 | End: 2017-10-07

## 2017-10-07 RX ORDER — MORPHINE SULFATE 4 MG/ML
4 INJECTION, SOLUTION INTRAMUSCULAR; INTRAVENOUS
Status: DISCONTINUED | OUTPATIENT
Start: 2017-10-07 | End: 2017-10-07

## 2017-10-07 RX ORDER — MIDAZOLAM HYDROCHLORIDE 1 MG/ML
INJECTION, SOLUTION INTRAMUSCULAR; INTRAVENOUS
Status: DISCONTINUED | OUTPATIENT
Start: 2017-10-07 | End: 2017-10-07

## 2017-10-07 RX ORDER — HYDROMORPHONE HYDROCHLORIDE 2 MG/ML
0.2 INJECTION, SOLUTION INTRAMUSCULAR; INTRAVENOUS; SUBCUTANEOUS EVERY 5 MIN PRN
Status: DISCONTINUED | OUTPATIENT
Start: 2017-10-07 | End: 2017-10-07 | Stop reason: HOSPADM

## 2017-10-07 RX ORDER — ACETAMINOPHEN 10 MG/ML
1000 INJECTION, SOLUTION INTRAVENOUS ONCE
Status: COMPLETED | OUTPATIENT
Start: 2017-10-07 | End: 2017-10-07

## 2017-10-07 RX ORDER — DEXAMETHASONE SODIUM PHOSPHATE 4 MG/ML
INJECTION, SOLUTION INTRA-ARTICULAR; INTRALESIONAL; INTRAMUSCULAR; INTRAVENOUS; SOFT TISSUE
Status: DISCONTINUED | OUTPATIENT
Start: 2017-10-07 | End: 2017-10-07

## 2017-10-07 RX ADMIN — ONDANSETRON 4 MG: 2 INJECTION, SOLUTION INTRAMUSCULAR; INTRAVENOUS at 04:10

## 2017-10-07 RX ADMIN — FENTANYL CITRATE 100 MCG: 50 INJECTION, SOLUTION INTRAMUSCULAR; INTRAVENOUS at 03:10

## 2017-10-07 RX ADMIN — MIDAZOLAM HYDROCHLORIDE 2 MG: 1 INJECTION, SOLUTION INTRAMUSCULAR; INTRAVENOUS at 03:10

## 2017-10-07 RX ADMIN — MEPERIDINE HYDROCHLORIDE 12.5 MG: 50 INJECTION INTRAMUSCULAR; INTRAVENOUS; SUBCUTANEOUS at 05:10

## 2017-10-07 RX ADMIN — KETOROLAC TROMETHAMINE 30 MG: 30 INJECTION, SOLUTION INTRAMUSCULAR at 01:10

## 2017-10-07 RX ADMIN — FENTANYL CITRATE 50 MCG: 50 INJECTION, SOLUTION INTRAMUSCULAR; INTRAVENOUS at 03:10

## 2017-10-07 RX ADMIN — DEXAMETHASONE SODIUM PHOSPHATE 8 MG: 4 INJECTION, SOLUTION INTRA-ARTICULAR; INTRALESIONAL; INTRAMUSCULAR; INTRAVENOUS; SOFT TISSUE at 03:10

## 2017-10-07 RX ADMIN — ONDANSETRON 4 MG: 2 INJECTION INTRAMUSCULAR; INTRAVENOUS at 01:10

## 2017-10-07 RX ADMIN — FENTANYL CITRATE 25 MCG: 50 INJECTION INTRAMUSCULAR; INTRAVENOUS at 05:10

## 2017-10-07 RX ADMIN — LIDOCAINE HYDROCHLORIDE 80 MG: 10 INJECTION, SOLUTION INFILTRATION; PERINEURAL at 03:10

## 2017-10-07 RX ADMIN — HYDROMORPHONE HYDROCHLORIDE 0.2 MG: 2 INJECTION, SOLUTION INTRAMUSCULAR; INTRAVENOUS; SUBCUTANEOUS at 06:10

## 2017-10-07 RX ADMIN — ROCURONIUM BROMIDE 30 MG: 10 INJECTION, SOLUTION INTRAVENOUS at 03:10

## 2017-10-07 RX ADMIN — DIPHENHYDRAMINE HYDROCHLORIDE 25 MG: 50 INJECTION, SOLUTION INTRAMUSCULAR; INTRAVENOUS at 06:10

## 2017-10-07 RX ADMIN — ROBINUL 0.4 MG: 0.2 INJECTION INTRAMUSCULAR; INTRAVENOUS at 04:10

## 2017-10-07 RX ADMIN — MORPHINE SULFATE 2 MG: 10 INJECTION, SOLUTION INTRAMUSCULAR; INTRAVENOUS at 04:10

## 2017-10-07 RX ADMIN — MORPHINE SULFATE 2 MG: 10 INJECTION, SOLUTION INTRAMUSCULAR; INTRAVENOUS at 05:10

## 2017-10-07 RX ADMIN — ACETAMINOPHEN 1000 MG: 10 INJECTION, SOLUTION INTRAVENOUS at 03:10

## 2017-10-07 RX ADMIN — CEFAZOLIN SODIUM 2 G: 2 SOLUTION INTRAVENOUS at 03:10

## 2017-10-07 RX ADMIN — FENTANYL CITRATE 50 MCG: 50 INJECTION, SOLUTION INTRAMUSCULAR; INTRAVENOUS at 04:10

## 2017-10-07 RX ADMIN — OXYCODONE HYDROCHLORIDE 5 MG: 5 TABLET ORAL at 06:10

## 2017-10-07 RX ADMIN — SODIUM CHLORIDE, SODIUM LACTATE, POTASSIUM CHLORIDE, AND CALCIUM CHLORIDE: 600; 310; 30; 20 INJECTION, SOLUTION INTRAVENOUS at 03:10

## 2017-10-07 RX ADMIN — SODIUM CHLORIDE, SODIUM LACTATE, POTASSIUM CHLORIDE, AND CALCIUM CHLORIDE: 600; 310; 30; 20 INJECTION, SOLUTION INTRAVENOUS at 04:10

## 2017-10-07 RX ADMIN — PROMETHAZINE HYDROCHLORIDE 6.25 MG: 25 INJECTION INTRAMUSCULAR; INTRAVENOUS at 05:10

## 2017-10-07 RX ADMIN — NEOSTIGMINE METHYLSULFATE 3 MG: 1 INJECTION INTRAVENOUS at 04:10

## 2017-10-07 RX ADMIN — KETOROLAC TROMETHAMINE 15 MG: 30 INJECTION, SOLUTION INTRAMUSCULAR at 07:10

## 2017-10-07 RX ADMIN — PROPOFOL 150 MG: 10 INJECTION, EMULSION INTRAVENOUS at 03:10

## 2017-10-07 NOTE — DISCHARGE INSTRUCTIONS
General Information:    1. Do not drink alcoholic beverages including beer for 24 hours or as long as you are on pain medication..  2. Do not drive a motor vehicle, operate machinery or power tools, or signs legal papers for 24 hours or as long as you are on pain medication.   3. You may experience light-headedness, dizziness, and sleepiness following surgery. Please do not stay alone. A responsible adult should be with you for this 24 hour period.  4. Go home and rest.  5. Progress slowly to a normal diet unless instructed.  Otherwise, begin with liquids such as soft drinks, then soup and crackers working up to solid foods. Drink plenty of nonalcoholic fluids.  6. Certain anesthetics and pain medications produce nausea and vomiting in certain individuals. If nausea becomes a problem at home, call you doctor.  7. A nurse will be calling you sometime after surgery. Do not be alarmed. This is our way of finding out how you are doing.  8. Several times every hour while you are awake, take 2-3 deep breaths and cough. If you had stomach surgery hold a pillow or rolled towel firmly against your stomach before you cough. This will help with any pain the cough might cause.  9. Several times every hour while you are awake, pump and flex your feet 5-6 times and do foot circles. This will help prevent blood clots.  10. Call your doctor for severe pain, bleeding, fever, or signs or symptoms of infection (pain, swelling, redness, foul odor, drainage).  11. You can contact your doctor anytime by [phone] for the [facility] (at [facility]) or [phone] for the [facility] on [facility].   [email or web address] is another way to contact your doctor if you are an active participant online with [facility].

## 2017-10-07 NOTE — TRANSFER OF CARE
"Anesthesia Transfer of Care Note    Patient: Laisha Rico    Procedure(s) Performed: Procedure(s) (LRB):  LAPAROSCOPY-DIAGNOSTIC (N/A)  SALPINGECTOMY-LAPAROSCOPIC (Bilateral)    Patient location: GI    Anesthesia Type: MAC    Transport from OR: Transported from OR on room air with adequate spontaneous ventilation    Post pain: adequate analgesia    Post assessment: no apparent anesthetic complications    Post vital signs: stable    Level of consciousness: awake, alert and oriented    Nausea/Vomiting: no nausea/vomiting    Complications: none    Transfer of care protocol was followed      Last vitals:   Visit Vitals  /73   Pulse 64   Temp 36.8 °C (98.2 °F) (Oral)   Resp 13   Ht 5' 8" (1.727 m)   Wt 57.6 kg (127 lb)   LMP 08/29/2017   SpO2 100%   BMI 19.31 kg/m²     "

## 2017-10-07 NOTE — ANESTHESIA PREPROCEDURE EVALUATION
10/07/2017  Laisha Rico is a 42 y.o., female.    Anesthesia Evaluation    I have reviewed the Patient Summary Reports.        Review of Systems  Anesthesia Hx:  No problems with previous Anesthesia    Cardiovascular:   Denies CAD.       Pulmonary:   Denies Asthma.  Denies Shortness of breath.    OB/GYN/PEDS:  Presented to ER w/ RLQ abdominal pain, abdominal imaging displayed the following c/w ectopic pregnancy:   The right ovary measures 3.0 x 2.3 x 2.2 cm, and the left ovary measures 2.0 x 1.1 x 1.7 cm. Color and spectral Doppler evaluation of both ovaries demonstrates no evidence of torsion. There is a cyst with peripheral perfusion within the right ovary which likely relates to a corpus luteum.  It measures 1.8 x 1.64 1.5 cm.       Endocrine:   Denies Diabetes.        Physical Exam  General:  Well nourished    Airway/Jaw/Neck:  Airway Findings: Mallampati: I TM Distance: Normal, at least 6 cm  Jaw/Neck Findings:  Neck ROM: Normal ROM       Chest/Lungs:  Chest/Lungs Findings: Clear to auscultation     Heart/Vascular:  Heart Findings: Rate: Normal        Mental Status:  Mental Status Findings:  Cooperative         Anesthesia Plan  Type of Anesthesia, risks & benefits discussed:  Anesthesia Type:  general  Patient's Preference:   Intra-op Monitoring Plan:   Intra-op Monitoring Plan Comments:   Post Op Pain Control Plan: IV/PO Opioids PRN and multimodal analgesia  Post Op Pain Control Plan Comments:   Induction:    Beta Blocker:  Patient is not currently on a Beta-Blocker (No further documentation required).       Informed Consent: Patient understands risks and agrees with Anesthesia plan.  Questions answered. Anesthesia consent signed with patient.  ASA Score: 2  emergent   Day of Surgery Review of History & Physical: I have interviewed and examined the patient. I have reviewed the patient's H&P  dated:  There are no significant changes.          Ready For Surgery From Anesthesia Perspective.     Sodium 136 - 145 mmol/L 141  139      Potassium 3.5 - 5.1 mmol/L 3.9  3.9     Chloride 95 - 110 mmol/L 110  107     CO2 23 - 29 mmol/L 22   22      Glucose 70 - 110 mg/dL 90  88     BUN, Bld 6 - 20 mg/dL 18  16     Creatinine 0.5 - 1.4 mg/dL 0.9  0.8     Calcium 8.7 - 10.5 mg/dL 8.9  9.6     Total Protein 6.0 - 8.4 g/dL 6.3  7.6     Albumin 3.5 - 5.2 g/dL 3.7  4.5     Total Bilirubin 0.1 - 1.0 mg/dL 0.6       WBC 3.90 - 12.70 K/uL 2.40   6.95      RBC 4.00 - 5.40 M/uL 3.54   4.33     Hemoglobin 12.0 - 16.0 g/dL 10.6   13.2     Hematocrit 37.0 - 48.5 % 31.9   38.8     MCV 82 - 98 fL 90  90     MCH 27.0 - 31.0 pg 29.9  30.5     MCHC 32.0 - 36.0 g/dL 33.2  34.0     RDW 11.5 - 14.5 % 13.1  13.0     Platelets 150 - 350 K/uL 133

## 2017-10-07 NOTE — H&P
[facility] -   Obstetrics  History & Physical    Patient Name: Laisha Rico  MRN: [id number]  Admission Date: 10/7/2017  Primary Care Provider: Primary Doctor No    Subjective:     Principal Problem:Ectopic pregnancy without intrauterine pregnancy    History of Present Illness: 41 yo  with exacerbation of lower abdominal pain.  Suspected tubal pregnancy 10 days ago treated with methotrexate now with rising HCG and ultrasound finding of right adnexal mass.       Obstetric History       T6      L0     SAB0   TAB0   Ectopic1   Multiple0   Live Births6       # Outcome Date GA Lbr Kirill/2nd Weight Sex Delivery Anes PTL Lv   7 Ectopic            6 Term            5 Term            4 Term            3 Term            2 Term            1 Term               Obstetric Comments   Patient has had 6 vaginal births with 6 living children     Past Medical History:   Diagnosis Date    Migraines      Past Surgical History:   Procedure Laterality Date    removed chicken bone from throat           Review of patient's allergies indicates:   Allergen Reactions    Iodine and iodide containing products Itching, Swelling and Rash        Family History     None        Social History Main Topics    Smoking status: Never Smoker    Smokeless tobacco: Never Used    Alcohol use No    Drug use: No    Sexual activity: Yes     Review of Systems   Constitutional: Positive for activity change. Negative for chills, diaphoresis and fever.   Respiratory: Negative for shortness of breath.    Cardiovascular: Negative for chest pain and palpitations.   Gastrointestinal: Positive for abdominal pain, bloating and nausea. Negative for constipation, diarrhea and vomiting.   Endocrine: Negative.    Genitourinary: Positive for menorrhagia and pelvic pain. Negative for flank pain and hematuria.   Neurological: Positive for headaches (patient has history of migraines). Negative for seizures.   Hematological: Negative  for adenopathy.   Psychiatric/Behavioral: Negative for depression. The patient is not nervous/anxious.    Breast: negative.    All other systems reviewed and are negative.     Objective:     Vital Signs (Most Recent):  Temp: 98.2 °F (36.8 °C) (10/07/17 1237)  Pulse: 64 (10/07/17 1317)  Resp: 13 (10/07/17 1317)  BP: 122/73 (10/07/17 1317)  SpO2: 100 % (10/07/17 1317) Vital Signs (24h Range):  Temp:  [98.2 °F (36.8 °C)] 98.2 °F (36.8 °C)  Pulse:  [62-69] 64  Resp:  [13-18] 13  SpO2:  [100 %] 100 %  BP: (114-122)/(59-73) 122/73     Weight: 57.6 kg (127 lb)  Body mass index is 19.31 kg/m².    Physical Exam:   Constitutional: She is oriented to person, place, and time. She appears well-developed and well-nourished. No distress.    HENT:   Head: Normocephalic and atraumatic.    Eyes: EOM are normal. Pupils are equal, round, and reactive to light.    Neck: Normal range of motion. Neck supple. No thyromegaly present.    Cardiovascular: Normal rate and regular rhythm.   Pulse Score: 2+   Pulmonary/Chest: Effort normal and breath sounds normal.        Abdominal: Soft. Bowel sounds are normal. She exhibits no distension. There is tenderness (right lower quadrant, bilateral suprapubic ). There is no guarding.     Genitourinary:   Genitourinary Comments: Uterine prolapse           Musculoskeletal: Normal range of motion.       Neurological: She is alert and oriented to person, place, and time.    Skin: Skin is warm and dry.    Psychiatric: She has a normal mood and affect.   Patient tearful but in no distress           Significant Labs:  Lab Results   Component Value Date    [facility] B POS 09/27/2017       I have personallly reviewed all pertinent lab results from the last 24 hours.    Assessment/Plan:     42 y.o. female No obstetric history on file    Active Diagnoses:    Diagnosis Date Noted POA    PRINCIPAL PROBLEM:  Ectopic pregnancy without intrauterine pregnancy [O00.90] 09/27/2017 Yes      Problems Resolved During this  Admission:    Diagnosis Date Noted Date Resolved POA     Laparoscopy with right salpingectomy, left salpingectomy for sterilization     Josette Coyne PA-C  Obstetrics  [facility]

## 2017-10-07 NOTE — ED PROVIDER NOTES
"SCRIBE #1 NOTE: I, Cedrick Tamayo, am scribing for, and in the presence of, Jud Paniagua MD. I have scribed the entire note.      History      Chief Complaint   Patient presents with    Flank Pain     R sided flank pain, pt was given "a shot" to disolve her ectopic pregnancy but has started having worsening pain and her HCG level has increased       Review of patient's allergies indicates:   Allergen Reactions    Iodine and iodide containing products Itching, Swelling and Rash        HPI   HPI    10/7/2017, 12:42 PM   History obtained from the patient      History of Present Illness: Laisha Rico is a 42 y.o. female patient who presents to the Emergency Department for R sided abd pain which onset gradually 10 days ago and has worsened over time. Symptoms are constant and moderate in severity. Pt reports getting a dose of methotrexate to dissolve her ectopic pregnancy on 9/27/17. Pt had labs drawn 10/2/17 and 10/5/17 and reports her HCG levels have continued to increase. Pt was advised to come to the ED by her OB-GYN due to continued pain. No mitigating or exacerbating factors reported. Associated sxs include vaginal bleeding onset 1 week ago that has lightened over time. Patient denies any fever, chills, constipation, hematochezia, dysuria, hematuria, urinary frequency, N/V/D, HA, lightheadedness, dizziness, vaginal discharge, pelvic pain, and all other sxs at this time. Pt reports having a cup of coffee and part of a biscuit this morning at 8am. Pt has had 6 previous pregnancies and her OB-GYN is Dr. Fern[facility]. No further complaints or concerns at this time.       Arrival mode: Personal vehicle    PCP: Not given       Past Medical History:  Past Medical History:   Diagnosis Date    Migraines        Past Surgical History:  Past Surgical History:   Procedure Laterality Date    removed chicken bone from throat           Family History:  History reviewed. No pertinent family " history.    Social History:  Social History     Social History Main Topics    Smoking status: Never Smoker    Smokeless tobacco: Never Used    Alcohol use No    Drug use: No    Sexual activity: Yes       ROS   Review of Systems   Constitutional: Negative for chills and fever.   HENT: Negative for sore throat.    Respiratory: Negative for shortness of breath.    Cardiovascular: Negative for chest pain.   Gastrointestinal: Positive for abdominal pain (R sided). Negative for blood in stool, constipation, diarrhea, nausea and vomiting.   Genitourinary: Positive for vaginal bleeding. Negative for dysuria, frequency, hematuria, pelvic pain and vaginal discharge.   Musculoskeletal: Negative for back pain.   Skin: Negative for rash.   Neurological: Negative for dizziness, weakness, light-headedness and headaches.   Hematological: Does not bruise/bleed easily.   All other systems reviewed and are negative.      Physical Exam      Initial Vitals [10/07/17 1237]   BP Pulse Resp Temp SpO2   (!) 117/59 69 18 98.2 °F (36.8 °C) 100 %      MAP       78.33          Physical Exam  Nursing Notes and Vital Signs Reviewed.  Constitutional: Patient is in no acute distress. Well-developed and well-nourished.  Head: Atraumatic. Normocephalic.  Eyes: PERRL. EOM intact. Conjunctivae are not pale. No scleral icterus.  ENT: Mucous membranes are moist.   Neck: Supple. Full ROM. No lymphadenopathy.  Cardiovascular: Regular rate. Regular rhythm.   Pulmonary/Chest: No respiratory distress.   Abdominal: Soft and non-distended.  There is mild RLQ tenderness.  No rebound, guarding, or rigidity. Good bowel sounds. No peritoneal signs.  Musculoskeletal: Moves all extremities. No obvious deformities. No edema.   Skin: Warm and dry.  Neurological:  Alert, awake, and appropriate.  Normal speech.  No acute focal neurological deficits are appreciated.  Psychiatric: Normal affect. Good eye contact. Appropriate in content.    ED Course    Procedures  ED  "Vital Signs:  Vitals:    10/07/17 1237 10/07/17 1259 10/07/17 1317 10/07/17 1645   BP: (!) 117/59 114/63 122/73 (!) 118/58   Pulse: 69 62 64 64   Resp: 18 13 13 12   Temp: 98.2 °F (36.8 °C)   98.1 °F (36.7 °C)   TempSrc: Oral   Temporal   SpO2: 100% 100% 100% 100%   Weight: 57.6 kg (127 lb)      Height: 5' 8" (1.727 m)       10/07/17 1650 10/07/17 1655 10/07/17 1705 10/07/17 1710   BP: (!) 140/74 133/77 (!) 144/69 120/69   Pulse: 60 61 (!) 57 85   Resp: 20 17 14 12   Temp:       TempSrc:       SpO2: 96% 99% 98% 99%   Weight:       Height:        10/07/17 1715 10/07/17 1720 10/07/17 1730 10/07/17 1745   BP: 120/69 116/77 (!) 97/54 (!) 107/59   Pulse: (!) 55 (!) 57 (!) 56 (!) 52   Resp: 15 16 16 12   Temp:    97 °F (36.1 °C)   TempSrc:    Temporal   SpO2: 100% 95% 99% 100%   Weight:       Height:        10/07/17 1800   BP: (!) 105/58   Pulse: (!) 55   Resp: 16   Temp:    TempSrc:    SpO2: 99%   Weight:    Height:        Abnormal Lab Results:  Labs Reviewed   CBC W/ AUTO DIFFERENTIAL - Abnormal; Notable for the following:        Result Value    WBC 2.40 (*)     RBC 3.54 (*)     Hemoglobin 10.6 (*)     Hematocrit 31.9 (*)     Platelets 133 (*)     Gran # 1.3 (*)     Lymph # 0.8 (*)     All other components within normal limits   COMPREHENSIVE METABOLIC PANEL - Abnormal; Notable for the following:     CO2 22 (*)     Alkaline Phosphatase 45 (*)     All other components within normal limits   HCG, QUANTITATIVE, PREGNANCY        All Lab Results:  Results for orders placed or performed during the hospital encounter of 10/07/17   CBC auto differential   Result Value Ref Range    WBC 2.40 (L) 3.90 - 12.70 K/uL    RBC 3.54 (L) 4.00 - 5.40 M/uL    Hemoglobin 10.6 (L) 12.0 - 16.0 g/dL    Hematocrit 31.9 (L) 37.0 - 48.5 %    MCV 90 82 - 98 fL    MCH 29.9 27.0 - 31.0 pg    MCHC 33.2 32.0 - 36.0 g/dL    RDW 13.1 11.5 - 14.5 %    Platelets 133 (L) 150 - 350 K/uL    MPV 9.6 9.2 - 12.9 fL    Gran # 1.3 (L) 1.8 - 7.7 K/uL    Lymph # 0.8 " (L) 1.0 - 4.8 K/uL    Mono # 0.3 0.3 - 1.0 K/uL    Eos # 0.1 0.0 - 0.5 K/uL    Baso # 0.00 0.00 - 0.20 K/uL    Gran% 52.5 38.0 - 73.0 %    Lymph% 32.5 18.0 - 48.0 %    Mono% 12.1 4.0 - 15.0 %    Eosinophil% 2.9 0.0 - 8.0 %    Basophil% 0.0 0.0 - 1.9 %    Differential Method Automated    Comprehensive metabolic panel   Result Value Ref Range    Sodium 141 136 - 145 mmol/L    Potassium 3.9 3.5 - 5.1 mmol/L    Chloride 110 95 - 110 mmol/L    CO2 22 (L) 23 - 29 mmol/L    Glucose 90 70 - 110 mg/dL    BUN, Bld 18 6 - 20 mg/dL    Creatinine 0.9 0.5 - 1.4 mg/dL    Calcium 8.9 8.7 - 10.5 mg/dL    Total Protein 6.3 6.0 - 8.4 g/dL    Albumin 3.7 3.5 - 5.2 g/dL    Total Bilirubin 0.6 0.1 - 1.0 mg/dL    Alkaline Phosphatase 45 (L) 55 - 135 U/L    AST 11 10 - 40 U/L    ALT 13 10 - 44 U/L    Anion Gap 9 8 - 16 mmol/L    eGFR if African American >60 >60 mL/min/1.73 m^2    eGFR if non African American >60 >60 mL/min/1.73 m^2   hCG, quantitative, pregnancy   Result Value Ref Range    hCG Quant 887 See Text mIU/mL   Type & Screen   Result Value Ref Range    Group & Rh B POS     Indirect Nya NEG          Imaging Results:  Imaging Results          US OB Less Than 14 Wks with Transvaginal (xpd) (Final result)  Result time 10/07/17 14:20:56   Procedure changed from US OB Less Than 14 Wks First Gestation     Final result by VIVIANA Portillo Sr., MD (10/07/17 14:20:56)                 Impression:           1. A normal appearing right ovary is not visualized. There are several masses in the right adnexal region. The larger one is heterogeneous in appearance and measures 3.9 cm in greatest dimension. There is a ring shaped mass also noted in the right adnexal region. It measures 12 mm in greatest dimension. This is consistent with the patient's history of an ectopic pregnancy.  2. A moderate amount of hemorrhage is visualized within the endometrial cavity of the maternal uterus.  3. There is a persistent small amount of hypoechoic fluid  within the pelvis.      Electronically signed by: VIVIANA MEJIA MD  Date:     10/07/17  Time:    14:20              Narrative:    Pregnancy ultrasound examination    Clinical History: Ectopic pregnancy; patient is been treated with methotrexate; hemorrhage in the pelvis    Technique: Multiple static ultrasound images are submitted for interpretation.    Finding: Comparison was made to a prior examination performed on 9/27/2017. The maternal uterus measures 7.1 cm in craniocaudal dimension by 4.6 cm in AP dimension by 4.2 cm in mediolateral dimension. A moderate amount of hemorrhage is visualized within the endometrial cavity of the maternal uterus.    The maternal ovaries are normal in appearance. The left maternal ovary measures 3 per 1 cm x 1.7 cm x 2.2 cm. It has normal arterial flow with a peak systolic velocity of 20 cm/s. A normal appearing right ovary is not visualized. There are several masses in the right adnexal region. The larger one is heterogeneous in appearance and measures 3.9 cm in greatest dimension. There is a ring shaped mass also noted in the right adnexal region. It measures 12 mm in greatest dimension. There is a persistent small amount of hypoechoic fluid within the pelvis.                                  The Emergency Provider reviewed the vital signs and test results, which are outlined above.    ED Discussion     1:00 PM: Dr. Paniagua discussed the pt's case with Dr. VIVIANA Ramirez (OB-GYN) who recommends getting repeat labs, beta HCG, and US performed.    1:08 PM: Re-evaluated pt. Pt is resting comfortably and is in no acute distress. D/w pt all pertinent results. D/w pt any concerns expressed at this time. Answered all questions. Pt expresses understanding at this time.    2:15 PM: Re-evaluated pt. Pt is resting comfortably and is in no acute distress. D/w pt all pertinent results. D/w pt any concerns expressed at this time. Answered all questions. Pt expresses understanding at this  time.    2:35 PM: Discussed case with Dr. VIVIANA Ramirez (OB-GYN). Dr. Ramirez agrees with current care and management of pt and accepts admission to the OR.   Admitting Service: Hospital medicine   Admitting Physician: Ashley  Admit to: Obs to OR    2:37 PM: Re-evaluated pt. I have discussed test results, shared treatment plan, and the need for admission with patient and family at bedside. Pt and family express understanding at this time and agree with all information. All questions answered. Pt and family have no further questions or concerns at this time. Pt is ready for admit.      ED Medication(s):  Medications   cefazolin (ANCEF) 2 gram in dextrose 5% 50 mL IVPB (premix) (2 g Intravenous Given 10/7/17 1546)   sodium chloride 0.9% flush 3 mL (not administered)   sodium chloride 0.9% flush 3 mL (not administered)   oxycodone immediate release tablet 5 mg (not administered)   hydromorphone (PF) injection 0.2 mg (0.2 mg Intravenous Given 10/7/17 1814)   diphenhydrAMINE injection 25 mg (25 mg Intravenous Given 10/7/17 1814)   ondansetron injection 4 mg (not administered)   promethazine (PHENERGAN) 6.25 mg in dextrose 5 % 50 mL IVPB (6.25 mg Intravenous New Bag 10/7/17 1703)   ketorolac injection 15 mg (not administered)   morphine injection 2 mg (2 mg Intravenous Given 10/7/17 1725)   promethazine (PHENERGAN) 6.25 mg in dextrose 5 % 50 mL IVPB (not administered)   ondansetron disintegrating tablet 8 mg (not administered)   ketorolac injection 30 mg (30 mg Intravenous Given 10/7/17 1318)   ondansetron injection 4 mg (4 mg Intravenous Given 10/7/17 1318)   acetaminophen (10 mg/mL) injection 1,000 mg (1,000 mg Intravenous Given 10/7/17 1549)   fentaNYL injection 25 mcg (25 mcg Intravenous Given 10/7/17 1730)   meperidine injection 12.5 mg (12.5 mg Intravenous Given 10/7/17 1711)     Follow-up Information     F Hilton Ramirez MD In 2 weeks.    Specialties:  Obstetrics, Obstetrics and Gynecology  Why:  For post  operative follow up   Contact information:  [address]  [place] [id number] [phone]                     Medical Decision Making    Medical Decision Making:   Clinical Tests:   Lab Tests: Ordered and Reviewed  Radiological Study: Ordered and Reviewed           Scribe Attestation:   Scribe #1: I performed the above scribed service and the documentation accurately describes the services I performed. I attest to the accuracy of the note.    Attending:   Physician Attestation Statement for Scribe #1: I, Jud Paniagua MD, personally performed the services described in this documentation, as scribed by Cedrick Tamayo, in my presence, and it is both accurate and complete.          Clinical Impression       ICD-10-CM ICD-9-CM   1. Right tubal pregnancy without intrauterine pregnancy O00.101 633.10   2. Right lower quadrant pain R10.31 789.03   3. Right ovarian pregnancy without intrauterine pregnancy O00.201 633.20       Disposition:   Disposition: Placed in Observation (to OR)  Condition: Stable         Jud Paniagua MD  10/07/17 1821

## 2017-10-07 NOTE — HPI
41 yo  with exacerbation of lower abdominal pain.  Suspected tubal pregnancy 10 days ago treated with methotrexate now with rising HCG and ultrasound finding of right adnexal mass.

## 2017-10-07 NOTE — OP NOTE
[facility]  Surgery Department  Operative Note    SUMMARY     Date of Procedure: 10/7/2017     Procedure: Procedure(s) (LRB):  LAPAROSCOPY-DIAGNOSTIC (N/A)  SALPINGECTOMY-LAPAROSCOPIC (Bilateral)     Surgeon(s) and Role:     * RONAK Ramirez MD - Primary    Assisting Surgeon:     Josette GARCIA assistance necessary for this procedure     Pre-Operative Diagnosis: Right tubal pregnancy without intrauterine pregnancy [O00.101]    Desires Sterilization     Post-Operative Diagnosis: Post-Op Diagnosis Codes:     * Right tubal pregnancy without intrauterine pregnancy [O00.101]     Desires Sterilization     Anesthesia: General    Technical Procedures Used: none    Description of the Findings of the Procedure:     Findings:        Upper Abdomen:  No organomegaly, masses or significant adhesions      Bowel:  Soni appearance with no significant adhesions      Pelvis:  Uterus:  Normal size and shape, grade 3 prolapse                    Right Adnexa  Normal ovary, unruptured tubal pregnancy in the distal third with leakage of blood into the cul de sac, 30 cc                    Left Adnexa   Normal tube and ovary                    Cul de sac    30 cc old blood with clot                     Anterior peritoneum with no endometriosis and no adhesions       Ureters noted to be in normal anatomical location right and left side     Operative details        Once under general anesthesia, placed in supine lithotomy position       Vaginal prep with  Hibiclens                Cardenas placed,        Cervix dilated with arian dilator to 7.  Endometrium scrapped clean of old blood         Manipulator:  Zumi placed in the uterus with no difficulty        Abdominal prep  Chlorhexiding swab.       Drapes applied in the usual manner         Time out taken with all present in the room         Verrhes needle placed through the umbilicus while elevating  with towel clamps on each side of the umbilicus        Co2 gas  insuflation to 15 mm pressure          5 mm blunt trochar  placed through an  incision in the inferior  aspect of umbilicus          Trochar placement under direct visualization as follows:           11 mm suprapubic, 5 mm LLQ              Using grasper and the harmonic instrument with patient in trendelenburg position, the right and left fallopian tube taken down along the mesosalpinx                Good hemostasis noted          Both tubes placed into tissue catchment bag and removed intact through the suprapubic incision  Trochar sites closed with SQ suture and dermabond  Suprapubic fascia closed with vicryl suture            Cardenas removed, vaginal manipulator removed          Patient awakened and taken to recovery     Significant Surgical Tasks Conducted by the Assistant(s), if Applicable: Bedside assistance and wound closure     Complications: No    Estimated Blood Loss (EBL): 10 mL           Implants: * No implants in log *    Specimens:   Specimen (12h ago through future)    Start     Ordered    10/07/17 1614  Specimen to Pathology - Surgery  Once     Comments:  1.) Left Fallopian Tube (PERM)2.) Right Fallopian Tube with Ectopic Pregnancy (PERM)DX: Ectopic Pregnancy      10/07/17 1616                  Condition: Good    Disposition: PACU - hemodynamically stable.    Attestation: I was present and scrubbed for the entire procedure.

## 2017-10-07 NOTE — TELEPHONE ENCOUNTER
"  Reason for Disposition   SEVERE abdominal pain    Answer Assessment - Initial Assessment Questions  1. DIAGNOSIS CONFIRMATION: "When was the threatened miscarriage (threatened ) diagnosed?" "By whom?"       Ectopic pregnancy diagnosed  2. ULTRASOUND: "Was an ultrasound performed at that time?"  If so, "Do you know what it showed?"      No US. Blood work done Thursday. Methotrexate injection given last Wednesday.   3. PREGNANCY: "Do you know how many weeks or months pregnant you are?" "When was the first day of your last normal menstrual period?"      -  4. MAIN CONCERN: "What is your main concern right now?" "What questions do you have?"      Pain  5. VAGINAL BLEEDING: "Describe the bleeding that you are having." "How much bleeding is there?"     - SPOTTING: spotting or pinkish / brownish mucous discharge; 1-2 pads a day    - MILD:  less than 1 pad / hour; similar to mild menstrual bleeding    - MODERATE: 1-2 pads / hour; small-medium blood clots (e.g., pea, grape, small coin)     - SEVERE: soaking 2 or more pads/hour; bleeding not contained by pads or continuous red blood from vagina; large blood clots (e.g., golf ball, large coin)       Bleeding since last Saturday  6. ABDOMINAL PAIN: "Do you have any abdominal pain?"  If present, ask: "How bad is it?"  (e.g., Scale 1-10; mild, moderate, or severe)    - MILD (1-3): doesn't interfere with normal activities, abdomen soft and not tender to touch     - MODERATE (4-7): interferes with normal activities or awakens from sleep, tender to touch     - SEVERE (8-10): excruciating pain, doubled over, unable to do any normal activities      8/10. Took 4 Motrin 30 min ago.  7. HEMODYNAMIC STATUS: "Are you weak or feeling lightheaded?" If so, ask: "Can you stand and walk normally?"       Weak  8. OTHER SYMPTOMS: "What other symptoms are you having with the bleeding?" (e.g., passed tissue, vaginal discharge, fever, menstrual-type cramps, nausea, vomiting)      " No    Protocols used: ST  - THREATENED MISCARRIAGE FOLLOW-UP CALL-A-AH

## 2017-10-07 NOTE — DISCHARGE SUMMARY
Discharge Note  Short Stay      SUMMARY     Admit Date: 10/7/2017    Attending Physician: RONAK Santillan jr     Discharge Physician: RONAK santillan Jr     Discharge Date: 10/7/2017 4:37 PM    Summary:  Admitted to surgery from the ED with suspected tubal pregnancy in pain.  Laparoscopy done with removal of both fallopian tubes     Final Diagnosis:   Right tubal Pregnancy  Desires Sterilization   Uterine Prolapse     Disposition: Home or Self Care    Patient Instructions:   Current Discharge Medication List      START taking these medications    Details   hydrocodone-acetaminophen 5-325mg (NORCO) 5-325 mg per tablet Take 1 tablet by mouth every 6 (six) hours as needed for Pain.  Qty: 10 tablet, Refills: 0         STOP taking these medications       hydrocodone-acetaminophen 7.5-325mg (NORCO) 7.5-325 mg per tablet Comments:   Reason for Stopping:         ondansetron (ZOFRAN) 4 MG tablet Comments:   Reason for Stopping:               Discharge Procedure Orders (must include Diet, Follow-up, Activity)    Discharge Procedure Orders (must include Diet, Follow-up, Activity)  Diet general     Activity as tolerated     Shower on day dressing removed (No bath)     Call MD for:  temperature >100.4     Call MD for:  persistent nausea and vomiting     Call MD for:  severe uncontrolled pain     Call MD for:  difficulty breathing, headache or visual disturbances     Call MD for:  redness, tenderness, or signs of infection (pain, swelling, redness, odor or green/yellow discharge around incision site)     Call MD for:  hives     Call MD for:  persistent dizziness or light-headedness     Call MD for:  extreme fatigue     No dressing needed

## 2017-10-08 ENCOUNTER — NURSE TRIAGE (OUTPATIENT)
Dept: ADMINISTRATIVE | Facility: CLINIC | Age: 42
End: 2017-10-08

## 2017-10-09 ENCOUNTER — TELEPHONE (OUTPATIENT)
Dept: OBSTETRICS AND GYNECOLOGY | Facility: CLINIC | Age: 42
End: 2017-10-09

## 2017-10-09 VITALS
HEIGHT: 68 IN | BODY MASS INDEX: 19.25 KG/M2 | DIASTOLIC BLOOD PRESSURE: 60 MMHG | OXYGEN SATURATION: 99 % | SYSTOLIC BLOOD PRESSURE: 112 MMHG | WEIGHT: 127 LBS | TEMPERATURE: 98 F | RESPIRATION RATE: 16 BRPM | HEART RATE: 66 BPM

## 2017-10-09 DIAGNOSIS — O00.90 RUPTURED ECTOPIC PREGNANCY: Primary | ICD-10-CM

## 2017-10-09 RX ORDER — HYDROCODONE BITARTRATE AND ACETAMINOPHEN 5; 325 MG/1; MG/1
1 TABLET ORAL EVERY 6 HOURS PRN
Qty: 10 TABLET | Refills: 0 | Status: SHIPPED | OUTPATIENT
Start: 2017-10-09 | End: 2018-09-28

## 2017-10-09 NOTE — TELEPHONE ENCOUNTER
Called pt to check on her after ruptured ectopic  Apologized for her not being able to get through to office on Friday;    Still reports increased pain from surgery    No nausea/vomiting after reg diet    Denies passing flatus; requesting more pain meds

## 2018-09-28 ENCOUNTER — OFFICE VISIT (OUTPATIENT)
Dept: INTERNAL MEDICINE | Facility: CLINIC | Age: 43
End: 2018-09-28
Payer: COMMERCIAL

## 2018-09-28 VITALS
DIASTOLIC BLOOD PRESSURE: 76 MMHG | TEMPERATURE: 99 F | HEART RATE: 69 BPM | SYSTOLIC BLOOD PRESSURE: 118 MMHG | WEIGHT: 134.69 LBS | BODY MASS INDEX: 21.14 KG/M2 | RESPIRATION RATE: 16 BRPM | HEIGHT: 67 IN | OXYGEN SATURATION: 99 %

## 2018-09-28 DIAGNOSIS — L25.9 CONTACT DERMATITIS, UNSPECIFIED CONTACT DERMATITIS TYPE, UNSPECIFIED TRIGGER: Primary | ICD-10-CM

## 2018-09-28 PROCEDURE — 99999 PR PBB SHADOW E&M-EST. PATIENT-LVL III: CPT | Mod: PBBFAC,,, | Performed by: FAMILY MEDICINE

## 2018-09-28 PROCEDURE — 99213 OFFICE O/P EST LOW 20 MIN: CPT | Mod: 25,S$GLB,, | Performed by: FAMILY MEDICINE

## 2018-09-28 PROCEDURE — 96372 THER/PROPH/DIAG INJ SC/IM: CPT | Mod: S$GLB,,, | Performed by: FAMILY MEDICINE

## 2018-09-28 PROCEDURE — 3008F BODY MASS INDEX DOCD: CPT | Mod: CPTII,S$GLB,, | Performed by: FAMILY MEDICINE

## 2018-09-28 RX ORDER — HYDROXYZINE HYDROCHLORIDE 50 MG/1
50 TABLET, FILM COATED ORAL 4 TIMES DAILY
Qty: 30 TABLET | Refills: 1 | Status: SHIPPED | OUTPATIENT
Start: 2018-09-28

## 2018-09-28 RX ORDER — TRIAMCINOLONE ACETONIDE 1 MG/G
OINTMENT TOPICAL 2 TIMES DAILY
Qty: 30 G | Refills: 2 | Status: SHIPPED | OUTPATIENT
Start: 2018-09-28 | End: 2018-10-08

## 2018-09-28 RX ORDER — METHYLPREDNISOLONE ACETATE 80 MG/ML
80 INJECTION, SUSPENSION INTRA-ARTICULAR; INTRALESIONAL; INTRAMUSCULAR; SOFT TISSUE ONCE
Status: COMPLETED | OUTPATIENT
Start: 2018-09-28 | End: 2018-09-28

## 2018-09-28 RX ADMIN — METHYLPREDNISOLONE ACETATE 80 MG: 80 INJECTION, SUSPENSION INTRA-ARTICULAR; INTRALESIONAL; INTRAMUSCULAR; SOFT TISSUE at 09:09

## 2018-09-28 NOTE — PROGRESS NOTES
"Subjective:       Patient ID: Laisha Rico is a 43 y.o. female.    Chief Complaint: Rash      Patient reports rash for most of the week, is worsening. Very itchy. It is mostly in posterior neck with small occasional papules on arms and legs. No new hair products or other products that she knows of.      Review of Systems   Constitutional: Negative for activity change, appetite change and fever.   Skin: Positive for rash. Negative for color change and wound.     Past Medical History:   Diagnosis Date    Migraines      Past Surgical History:   Procedure Laterality Date    HYSTERECTOMY      LAPAROSCOPY-DIAGNOSTIC N/A 10/7/2017    Performed by RONAK Ramirez MD at [facility] OR    removed chicken bone from throat      SALPINGECTOMY-LAPAROSCOPIC Bilateral 10/7/2017    Performed by RONAK Ramirez MD at [facility] OR     History reviewed. No pertinent family history.  Social History     Socioeconomic History    Marital status:      Spouse name: Not on file    Number of children: 6    Years of education: Not on file    Highest education level: Not on file   Social Needs    Financial resource strain: Not on file    Food insecurity - worry: Not on file    Food insecurity - inability: Not on file    Transportation needs - medical: Not on file    Transportation needs - non-medical: Not on file   Occupational History    Occupation: human resources   Tobacco Use    Smoking status: Never Smoker    Smokeless tobacco: Never Used   Substance and Sexual Activity    Alcohol use: No    Drug use: No    Sexual activity: Yes   Other Topics Concern    Not on file   Social History Narrative    Not on file     Review of patient's allergies indicates:   Allergen Reactions    Iodine and iodide containing products Itching, Swelling and Rash       Objective:       /76   Pulse 69   Temp 99.3 °F (37.4 °C)   Resp 16   Ht 5' 6.93" (1.7 m)   Wt 61.1 kg (134 lb 11.2 oz)   LMP 08/29/2017   SpO2 99%   " BMI 21.14 kg/m²   Physical Exam   Constitutional: She appears well-developed and well-nourished. No distress.   Skin: She is not diaphoretic.   Fine erythematous papules, some in linear patterns on posterior neck from hairline to neckline. Few scattered papules on legs and arms.   Vitals reviewed.    Assessment:     1. Contact dermatitis, unspecified contact dermatitis type, unspecified trigger      Plan:   Contact dermatitis, unspecified contact dermatitis type, unspecified trigger    Other orders  -     methylPREDNISolone acetate injection 80 mg; Inject 1 mL (80 mg total) into the muscle once.  -     triamcinolone acetonide 0.1% (KENALOG) 0.1 % ointment; Apply topically 2 (two) times daily. for 10 days  Dispense: 30 g; Refill: 2  -     hydrOXYzine (ATARAX) 50 MG tablet; Take 1 tablet (50 mg total) by mouth 4 (four) times daily.  Dispense: 30 tablet; Refill: 1         Medication List           Accurate as of 9/28/18  4:27 PM. If you have any questions, ask your nurse or doctor.               START taking these medications    hydrOXYzine 50 MG tablet  Commonly known as:  ATARAX  Take 1 tablet (50 mg total) by mouth 4 (four) times daily.  Started by:  Suzie Tenorio MD     triamcinolone acetonide 0.1% 0.1 % ointment  Commonly known as:  KENALOG  Apply topically 2 (two) times daily. for 10 days  Started by:  Suzie Tenorio MD        STOP taking these medications    HYDROcodone-acetaminophen 5-325 mg per tablet  Commonly known as:  NORCO  Stopped by:  Suzie Tenorio MD           Where to Get Your Medications      These medications were sent to [email or web address] [address] [place], [place] - [address] [facility]  [address], [facility] [address]    Phone:  [phone]   · hydrOXYzine 50 MG tablet  · triamcinolone acetonide 0.1% 0.1 % ointment

## 2018-10-05 ENCOUNTER — TELEPHONE (OUTPATIENT)
Dept: INTERNAL MEDICINE | Facility: CLINIC | Age: 43
End: 2018-10-05

## 2018-10-05 NOTE — TELEPHONE ENCOUNTER
Patient came into clinic stating she was recently seen by Dr. Tenorio. She has since then been seen by a dermatologist and was diagnosed with lice. She is requesting treatment for two of her six children. Information was discuss with Osmani Blanco NP. Patient spoke with Osmani and will call out prescriptions to [facility].

## 2018-10-05 NOTE — TELEPHONE ENCOUNTER
----- Message from Anna Albert sent at 10/5/2018 12:36 PM CDT -----  needs callback rg previous appt, will elaborate...[phone] (home)

## 2020-01-31 NOTE — ANESTHESIA POSTPROCEDURE EVALUATION
"Anesthesia Post Evaluation    Patient: Laisha Rico    Procedure(s) Performed: Procedure(s) (LRB):  LAPAROSCOPY-DIAGNOSTIC (N/A)  SALPINGECTOMY-LAPAROSCOPIC (Bilateral)    Final Anesthesia Type: general  Patient participation: Yes- Able to Participate  Level of consciousness: awake and alert  Post-procedure vital signs: reviewed and stable  Pain management: adequate  Airway patency: patent  PONV status at discharge: No PONV  Anesthetic complications: no      Cardiovascular status: blood pressure returned to baseline  Respiratory status: unassisted  Hydration status: euvolemic          Visit Vitals  BP (!) 105/58 (BP Location: Left arm, Patient Position: Lying)   Pulse (!) 55   Temp 36.1 °C (97 °F) (Temporal)   Resp 16   Ht 5' 8" (1.727 m)   Wt 57.6 kg (127 lb)   LMP 08/29/2017   SpO2 99%   BMI 19.31 kg/m²       Pain/Radhames Score: Pain Assessment Performed: Yes (10/7/2017  6:00 PM)  Presence of Pain: complains of pain/discomfort (10/7/2017  6:00 PM)  Pain Rating Prior to Med Admin: 6 (10/7/2017  6:14 PM)  Radhames Score: 9 (10/7/2017  6:00 PM)      " DISPLAY PLAN FREE TEXT

## 2022-02-22 NOTE — TELEPHONE ENCOUNTER
Reason for Disposition   [1] SEVERE post-op pain (e.g., excruciating, pain scale 8-10) AND [2] not controlled with pain medications    Protocols used: ST POST-OP SYMPTOMS AND [id number]     calling with concerns of Pt complaining of pain from post op procedure.  Pt stating pain medication prescribe is not touching pain.  Pt rates pain 8/10 on pain scale.  Called and discussed with On Call (RONAK Ramirez MD); MD stated Pt needs to go to ED; Pt's # was given to MD.  MD called Pt and spoke to her about her concerns.   Orthopedics Orthopedics Orthopedics Orthopedics Orthopedics Vascular Surgery

## 2022-11-23 ENCOUNTER — OFFICE VISIT (OUTPATIENT)
Dept: URGENT CARE | Facility: CLINIC | Age: 47
End: 2022-11-23
Payer: COMMERCIAL

## 2022-11-23 VITALS
OXYGEN SATURATION: 98 % | BODY MASS INDEX: 21.53 KG/M2 | RESPIRATION RATE: 14 BRPM | DIASTOLIC BLOOD PRESSURE: 67 MMHG | HEIGHT: 66 IN | HEART RATE: 68 BPM | SYSTOLIC BLOOD PRESSURE: 116 MMHG | TEMPERATURE: 98 F | WEIGHT: 134 LBS

## 2022-11-23 DIAGNOSIS — J06.9 UPPER RESPIRATORY TRACT INFECTION, UNSPECIFIED TYPE: Primary | ICD-10-CM

## 2022-11-23 DIAGNOSIS — R50.9 FEVER, UNSPECIFIED FEVER CAUSE: ICD-10-CM

## 2022-11-23 DIAGNOSIS — R09.81 NASAL CONGESTION: ICD-10-CM

## 2022-11-23 LAB
CTP QC/QA: YES
CTP QC/QA: YES
POC MOLECULAR INFLUENZA A AGN: NEGATIVE
POC MOLECULAR INFLUENZA B AGN: NEGATIVE
SARS-COV-2 AG RESP QL IA.RAPID: NEGATIVE

## 2022-11-23 PROCEDURE — 87502 POCT INFLUENZA A/B MOLECULAR: ICD-10-PCS | Mod: QW,S$GLB,, | Performed by: NURSE PRACTITIONER

## 2022-11-23 PROCEDURE — 3008F BODY MASS INDEX DOCD: CPT | Mod: CPTII,S$GLB,, | Performed by: NURSE PRACTITIONER

## 2022-11-23 PROCEDURE — 87811 SARS-COV-2 COVID19 W/OPTIC: CPT | Mod: QW,S$GLB,, | Performed by: NURSE PRACTITIONER

## 2022-11-23 PROCEDURE — 3074F PR MOST RECENT SYSTOLIC BLOOD PRESSURE < 130 MM HG: ICD-10-PCS | Mod: CPTII,S$GLB,, | Performed by: NURSE PRACTITIONER

## 2022-11-23 PROCEDURE — 99213 OFFICE O/P EST LOW 20 MIN: CPT | Mod: S$GLB,,, | Performed by: NURSE PRACTITIONER

## 2022-11-23 PROCEDURE — 3008F PR BODY MASS INDEX (BMI) DOCUMENTED: ICD-10-PCS | Mod: CPTII,S$GLB,, | Performed by: NURSE PRACTITIONER

## 2022-11-23 PROCEDURE — 3078F PR MOST RECENT DIASTOLIC BLOOD PRESSURE < 80 MM HG: ICD-10-PCS | Mod: CPTII,S$GLB,, | Performed by: NURSE PRACTITIONER

## 2022-11-23 PROCEDURE — 1160F RVW MEDS BY RX/DR IN RCRD: CPT | Mod: CPTII,S$GLB,, | Performed by: NURSE PRACTITIONER

## 2022-11-23 PROCEDURE — 87502 INFLUENZA DNA AMP PROBE: CPT | Mod: QW,S$GLB,, | Performed by: NURSE PRACTITIONER

## 2022-11-23 PROCEDURE — 99213 PR OFFICE/OUTPT VISIT, EST, LEVL III, 20-29 MIN: ICD-10-PCS | Mod: S$GLB,,, | Performed by: NURSE PRACTITIONER

## 2022-11-23 PROCEDURE — 87811 SARS CORONAVIRUS 2 ANTIGEN POCT, MANUAL READ: ICD-10-PCS | Mod: QW,S$GLB,, | Performed by: NURSE PRACTITIONER

## 2022-11-23 PROCEDURE — 1159F PR MEDICATION LIST DOCUMENTED IN MEDICAL RECORD: ICD-10-PCS | Mod: CPTII,S$GLB,, | Performed by: NURSE PRACTITIONER

## 2022-11-23 PROCEDURE — 3074F SYST BP LT 130 MM HG: CPT | Mod: CPTII,S$GLB,, | Performed by: NURSE PRACTITIONER

## 2022-11-23 PROCEDURE — 1160F PR REVIEW ALL MEDS BY PRESCRIBER/CLIN PHARMACIST DOCUMENTED: ICD-10-PCS | Mod: CPTII,S$GLB,, | Performed by: NURSE PRACTITIONER

## 2022-11-23 PROCEDURE — 3078F DIAST BP <80 MM HG: CPT | Mod: CPTII,S$GLB,, | Performed by: NURSE PRACTITIONER

## 2022-11-23 PROCEDURE — 1159F MED LIST DOCD IN RCRD: CPT | Mod: CPTII,S$GLB,, | Performed by: NURSE PRACTITIONER

## 2022-11-23 RX ORDER — FLUTICASONE PROPIONATE 50 MCG
2 SPRAY, SUSPENSION (ML) NASAL DAILY
Qty: 11.1 ML | Refills: 0 | Status: SHIPPED | OUTPATIENT
Start: 2022-11-23

## 2022-11-24 NOTE — PROGRESS NOTES
"Subjective:       Patient ID: Laisha Rico is a 47 y.o. female.    Vitals:  height is 5' 6" (1.676 m) and weight is 60.8 kg (134 lb). Her tympanic temperature is 98 °F (36.7 °C). Her blood pressure is 116/67 and her pulse is 68. Her respiration is 14 and oxygen saturation is 98%.     Chief Complaint: Sinus Problem    Ms Interiano is a 47 year old female who presents for evaluation of low-grade fever (100.7). Associated symptoms include sore throat, body aches. Symptoms started yesterday.  She took Motrin today at 5:30 pm.     Sinus Problem  This is a new problem. The current episode started yesterday. The problem has been gradually worsening since onset. The maximum temperature recorded prior to her arrival was 100.4 - 100.9 F. Associated symptoms include chills, congestion, coughing, headaches, sinus pressure, sneezing and a sore throat. Pertinent negatives include no diaphoresis, ear pain, hoarse voice, neck pain, shortness of breath or swollen glands. Treatments tried: ibuprofen.     Constitution: Positive for chills. Negative for sweating.   HENT:  Positive for congestion, sinus pressure and sore throat. Negative for ear pain.    Neck: Negative for neck pain.   Respiratory:  Positive for cough. Negative for shortness of breath.    Skin:  Negative for erythema.   Allergic/Immunologic: Positive for sneezing.   Neurological:  Positive for headaches.     Objective:      Physical Exam   HENT:   Head: Normocephalic and atraumatic.   Mouth/Throat: Posterior oropharyngeal erythema (mild) present.   Eyes: Right eye exhibits no discharge. Left eye exhibits no discharge.   Cardiovascular:   No murmur heard.Exam reveals no gallop and no friction rub.   Pulmonary/Chest: No stridor. No respiratory distress. She has no wheezes. She has no rhonchi. She has no rales. She exhibits no tenderness.   Abdominal: She exhibits no distension and no mass. There is no abdominal tenderness. There is no rebound, no guarding, no " left CVA tenderness and no right CVA tenderness. No hernia.   Musculoskeletal:         General: No swelling, tenderness, deformity or signs of injury.      Right lower leg: No edema.      Left lower leg: No edema.   Neurological: She is alert. She displays no weakness and normal reflexes. No cranial nerve deficit or sensory deficit. Coordination and gait normal.   Skin: Skin is not pale and no rash. No bruising, No erythema and No lesion jaundice  Nursing note and vitals reviewed.      Assessment:       1. Upper respiratory tract infection, unspecified type    2. Fever, unspecified fever cause    3. Nasal congestion          Plan:         Upper respiratory tract infection, unspecified type    Fever, unspecified fever cause  -     POCT Influenza A/B MOLECULAR  -     SARS Coronavirus 2 Antigen, POCT Manual Read    Nasal congestion  -     fluticasone propionate (FLONASE) 50 mcg/actuation nasal spray; 2 sprays (100 mcg total) by Each Nostril route Daily.  Dispense: 11.1 mL; Refill: 0      Results for orders placed or performed in visit on 11/23/22   POCT Influenza A/B MOLECULAR   Result Value Ref Range    POC Molecular Influenza A Ag Negative Negative, Not Reported    POC Molecular Influenza B Ag Negative Negative, Not Reported     Acceptable Yes    SARS Coronavirus 2 Antigen, POCT Manual Read   Result Value Ref Range    SARS Coronavirus 2 Antigen Negative Negative     Acceptable Yes           PLEASE READ YOUR DISCHARGE INSTRUCTIONS ENTIRELY AS IT CONTAINS IMPORTANT INFORMATION.      Please drink plenty of fluids.    Please get plenty of rest.    Please return here or go to the Emergency Department for any concerns or worsening of condition.    Please take an over the counter antihistamine medication (allegra/Claritin/Zyrtec) of your choice as directed.    Try an over the counter decongestant like Mucinex D or Sudafed. You buy this behind the pharmacy counter    If you do have Hypertension  or palpitations, it is safe to take Coricidin HBP for relief of sinus symptoms.    If not allergic, please take over the counter Tylenol (Acetaminophen) and/or Motrin (Ibuprofen) as directed for control of pain and/or fever.  Please follow up with your primary care doctor or specialist as needed.    Sore throat recommendations: Warm fluids, warm salt water gargles, throat lozenges, tea, honey, soup, rest, hydration.    Use over the counter flonase: one spray each nostril twice daily OR two sprays each nostril once daily.     Sinus rinses DO NOT USE TAP WATER, if you must, water must be a rolling boil for 1 minute, let it cool, then use.  May use distilled water, or over the counter nasal saline rinses.  Vics vapor rub in shower to help open nasal passages.  May use nasal gel to keep passages moisturized.  May use Nasal saline sprays during the day for added relief of congestion.   For those who go to the gym, please do not use the sauna or steam room now to clear sinuses.    If you  smoke, please stop smoking.      Please return or see your primary care doctor if you develop new or worsening symptoms.     Please arrange follow up with your primary medical clinic as soon as possible. You must understand that you've received an Urgent Care treatment only and that you may be released before all of your medical problems are known or treated. You, the patient, will arrange for follow up as instructed. If your symptoms worsen or fail to improve you should go to the Emergency Room.

## 2024-01-15 ENCOUNTER — OFFICE VISIT (OUTPATIENT)
Dept: URGENT CARE | Facility: CLINIC | Age: 49
End: 2024-01-15
Payer: COMMERCIAL

## 2024-01-15 VITALS
BODY MASS INDEX: 21.53 KG/M2 | SYSTOLIC BLOOD PRESSURE: 146 MMHG | HEART RATE: 70 BPM | OXYGEN SATURATION: 98 % | RESPIRATION RATE: 16 BRPM | DIASTOLIC BLOOD PRESSURE: 67 MMHG | HEIGHT: 66 IN | TEMPERATURE: 98 F | WEIGHT: 134 LBS

## 2024-01-15 DIAGNOSIS — R21 RASH: Primary | ICD-10-CM

## 2024-01-15 DIAGNOSIS — R03.0 ELEVATED BP WITHOUT DIAGNOSIS OF HYPERTENSION: ICD-10-CM

## 2024-01-15 PROBLEM — Z90.710 H/O VAGINAL HYSTERECTOMY: Status: ACTIVE | Noted: 2024-01-15

## 2024-01-15 PROCEDURE — 99204 OFFICE O/P NEW MOD 45 MIN: CPT | Mod: S$GLB,,, | Performed by: NURSE PRACTITIONER

## 2024-01-15 RX ORDER — TRIAMCINOLONE ACETONIDE 1 MG/G
CREAM TOPICAL 2 TIMES DAILY
Qty: 28.4 G | Refills: 0 | Status: SHIPPED | OUTPATIENT
Start: 2024-01-15

## 2024-01-16 NOTE — PROGRESS NOTES
"Subjective:      Patient ID: Laisha Rico is a 48 y.o. female.    Vitals:  height is 5' 6" (1.676 m) and weight is 60.8 kg (134 lb). Her tympanic temperature is 97.7 °F (36.5 °C). Her blood pressure is 146/67 (abnormal) and her pulse is 70. Her respiration is 16 and oxygen saturation is 98%.     Chief Complaint: Rash    Patient is a 28-year-old female who presents for evaluation of a rash.  Onset 4 weeks ago.  She reports an itchy rash under both arms.  She states she tried a new deodorant just prior to onset.  She was treating with hydrocortisone cream.  She does report that she received a steroid injection last week and had short relief.  She denies any rash to her mouth, swelling of her mouth, wheezing, difficulty swallowing, difficulty breathing.  No other concerns voiced.    Rash  This is a new problem. The current episode started 1 to 4 weeks ago. The problem is unchanged. The affected locations include the left axilla and right axilla. The rash is characterized by pain, itchiness and dryness. She was exposed to a new detergent/soap. Pertinent negatives include no cough, fever or shortness of breath. Past treatments include topical steroids. The treatment provided no relief. There is no history of allergies, asthma, eczema or varicella.       Constitution: Negative for fever.   HENT:  Negative for drooling, mouth sores, tongue pain and tongue lesion.    Respiratory:  Negative for cough, shortness of breath and wheezing.    Skin:  Positive for rash.      Objective:     Physical Exam   Constitutional: She is oriented to person, place, and time. She appears well-developed. She is cooperative.   HENT:   Head: Normocephalic and atraumatic.   Ears:   Right Ear: Hearing and external ear normal.   Left Ear: Hearing and external ear normal.   Nose: Nose normal. No mucosal edema or nasal deformity. No epistaxis. Right sinus exhibits no maxillary sinus tenderness and no frontal sinus tenderness. Left sinus " exhibits no maxillary sinus tenderness and no frontal sinus tenderness.   Mouth/Throat: Uvula is midline, oropharynx is clear and moist and mucous membranes are normal. No trismus in the jaw. Normal dentition. No uvula swelling.   Eyes: Conjunctivae and lids are normal.   Neck: Trachea normal and phonation normal. Neck supple.   Cardiovascular: Normal rate, regular rhythm, normal heart sounds and normal pulses.   Pulmonary/Chest: Effort normal and breath sounds normal.   Abdominal: Normal appearance and bowel sounds are normal. Soft.   Musculoskeletal: Normal range of motion.         General: Normal range of motion.   Neurological: She is alert and oriented to person, place, and time. She exhibits normal muscle tone.   Skin: Skin is warm, dry and intact.         Comments: Diffuse mild maculopapular rash to bilateral axillary regions.  Light erythematous base that blanches with palpation.  No swelling, drainage, lymphangitic streaking.  No decreased range of motion to adjacent joints.   Psychiatric: Her speech is normal and behavior is normal. Judgment and thought content normal.   Nursing note and vitals reviewed.      Assessment:     1. Rash    2. Elevated BP without diagnosis of hypertension        Plan:       Rash    Elevated BP without diagnosis of hypertension    Other orders  -     triamcinolone acetonide 0.1% (KENALOG) 0.1 % cream; Apply topically 2 (two) times daily.  Dispense: 28.4 g; Refill: 0          Medical Decision Making:   Initial Assessment:   Nontoxic appearing 49 yo female c/o rash.  Based on history and physical exam, clinical impression is rash consistent with contact dermatitis  History and exam findings not consistent with dangerous etiologies of rash such as Fransisco Gael Syndrome or Toxic epidermal necrolysis, or secondary dangerous causes such as petechial rashes from thrombocytopenia or rickettsial infections. There is no known tick bite and rash is not consistent with lyme exposure. Rash  does not appear urticarial with no signs of anaphylaxis either. Finally there is no evidence of rash to the palmar aspects of the hands/feet or the oral mucosa. I reviewed possible causes for contact dermatitis including new medication/supplements/foods/ household /laundry products/plant exposure. Plan at this time is to treat symptomatically, instruct to return for any new or worsening symptoms. Emergency precautions were given.      Patient is seen in clinic with no history of essential hypertension noted to be elevated today in clinic.  Patient was counseled that blood pressure was elevated. I advised adherence to a low-salt heart smart diet, exercise and self-monitoring.  Patient follow up with primary physician for further evaluation and possible management if hypertension persists.      Patient Instructions   You have an allergic reaction to something that you have come in contact with, either by touching your skin or ingesting. If you know what caused your rash avoid that substance.     Try to avoid scratching as that can make the rash worse and result in an infection.     Avoid heat and shower in cool water.     Take OTC Claritin or Zyrtec for the next 7 days.     Use the prescribed hydrocortisone cream to rash 2 x a day for itching.     Please go to the ER for worsening symptoms including difficulty swallowing, wheezing, swelling of the throat or mouth, chest pain, shortness of breath, or worsening rash.    Please return or see your primary care doctor if you develop new or worsening symptoms.     Please arrange follow up with your primary medical clinic as soon as possible. You must understand that you've received an Urgent Care treatment only and that you may be released before all of your medical problems are known or treated. You, the patient, will arrange for follow up as instructed. If your symptoms worsen or fail to improve you should go to the Emergency Room.

## 2024-01-16 NOTE — PATIENT INSTRUCTIONS
You have an allergic reaction to something that you have come in contact with, either by touching your skin or ingesting. If you know what caused your rash avoid that substance.     Try to avoid scratching as that can make the rash worse and result in an infection.     Avoid heat and shower in cool water.     Take OTC Claritin or Zyrtec for the next 7 days.     Use the prescribed hydrocortisone cream to rash 2 x a day for itching.     Please go to the ER for worsening symptoms including difficulty swallowing, wheezing, swelling of the throat or mouth, chest pain, shortness of breath, or worsening rash.    Please return or see your primary care doctor if you develop new or worsening symptoms.     Please arrange follow up with your primary medical clinic as soon as possible. You must understand that you've received an Urgent Care treatment only and that you may be released before all of your medical problems are known or treated. You, the patient, will arrange for follow up as instructed. If your symptoms worsen or fail to improve you should go to the Emergency Room.

## (undated) DEVICE — SEE MEDLINE ITEM 157117

## (undated) DEVICE — SCISSOR CURVED ENDOPATH 5MM

## (undated) DEVICE — TROCAR ENDOPATH XCEL 11MM 10CM

## (undated) DEVICE — ADHESIVE DERMABOND ADVANCED

## (undated) DEVICE — SEE MEDLINE ITEM 154981

## (undated) DEVICE — BAG TISS RETRV MONARCH 10MM

## (undated) DEVICE — NDL PNEUMO INSUFFLATI 120MM

## (undated) DEVICE — DRAPE LAVH LAPAROSCOPY W/FLUID

## (undated) DEVICE — GLOVE SURGICAL LATEX SZ 7

## (undated) DEVICE — SEE MEDLINE ITEM 157027

## (undated) DEVICE — GLOVE SURGICAL LATEX SZ 6.5

## (undated) DEVICE — TROCAR ENDOPATH XCEL 5X100MM

## (undated) DEVICE — SOL WATER STRL IRR 1000ML

## (undated) DEVICE — TUBING HEATED INSUFFLATOR

## (undated) DEVICE — SHEARS HARMONIC 5CM 36CM

## (undated) DEVICE — COVER OVERHEAD SURG LT BLUE

## (undated) DEVICE — SOL NS 1000CC

## (undated) DEVICE — IRRIGATOR ENDOSCOPY DISP.

## (undated) DEVICE — CARTRIDGE BABCOCK GRASPER 5X38

## (undated) DEVICE — SEE MEDLINE ITEM 146292

## (undated) DEVICE — DEVICE CLOSURE DISP 14G

## (undated) DEVICE — ELECTRODE REM PLYHSV RETURN 9

## (undated) DEVICE — CANNULA ENDOPATH XCEL 5X100MM

## (undated) DEVICE — KIT ANTIFOG

## (undated) DEVICE — SYR 3CC LUER LOC

## (undated) DEVICE — GLOVE BIOGEL ECLIPSE SZ 6.5

## (undated) DEVICE — APPLICATOR CHLORAPREP ORN 26ML

## (undated) DEVICE — SEE MEDLINE ITEM 157181

## (undated) DEVICE — GLOVE SURG BIOGEL LATEX SZ 7.5

## (undated) DEVICE — MANIFOLD 4 PORT